# Patient Record
Sex: MALE | Race: WHITE | NOT HISPANIC OR LATINO | Employment: OTHER | URBAN - METROPOLITAN AREA
[De-identification: names, ages, dates, MRNs, and addresses within clinical notes are randomized per-mention and may not be internally consistent; named-entity substitution may affect disease eponyms.]

---

## 2024-01-22 ENCOUNTER — OFFICE VISIT (OUTPATIENT)
Dept: WOUND CARE | Facility: HOSPITAL | Age: 74
End: 2024-01-22
Payer: COMMERCIAL

## 2024-01-22 VITALS
HEART RATE: 89 BPM | TEMPERATURE: 98.2 F | SYSTOLIC BLOOD PRESSURE: 153 MMHG | BODY MASS INDEX: 35.55 KG/M2 | HEIGHT: 69 IN | RESPIRATION RATE: 18 BRPM | DIASTOLIC BLOOD PRESSURE: 93 MMHG | WEIGHT: 240 LBS

## 2024-01-22 DIAGNOSIS — I50.32 CHRONIC HEART FAILURE WITH PRESERVED EJECTION FRACTION (HCC): ICD-10-CM

## 2024-01-22 DIAGNOSIS — R60.9 LIPEDEMA: ICD-10-CM

## 2024-01-22 DIAGNOSIS — L97.912 NON-PRESSURE ULCER OF LOWER EXTREMITY WITH FAT LAYER EXPOSED, RIGHT (HCC): Primary | ICD-10-CM

## 2024-01-22 DIAGNOSIS — I87.312 CHRONIC VENOUS HYPERTENSION (IDIOPATHIC) WITH ULCER OF LEFT LOWER EXTREMITY (CODE) (HCC): ICD-10-CM

## 2024-01-22 DIAGNOSIS — I87.311 CHRONIC VENOUS HYPERTENSION (IDIOPATHIC) WITH ULCER OF RIGHT LOWER EXTREMITY (CODE) (HCC): ICD-10-CM

## 2024-01-22 DIAGNOSIS — L97.922 NON-PRESSURE ULCER OF LOWER EXTREMITY WITH FAT LAYER EXPOSED, LEFT (HCC): ICD-10-CM

## 2024-01-22 DIAGNOSIS — I89.0 LYMPHEDEMA OF BOTH LOWER EXTREMITIES: ICD-10-CM

## 2024-01-22 PROCEDURE — 97598 DBRDMT OPN WND ADDL 20CM/<: CPT | Performed by: STUDENT IN AN ORGANIZED HEALTH CARE EDUCATION/TRAINING PROGRAM

## 2024-01-22 PROCEDURE — 97597 DBRDMT OPN WND 1ST 20 CM/<: CPT | Performed by: STUDENT IN AN ORGANIZED HEALTH CARE EDUCATION/TRAINING PROGRAM

## 2024-01-22 PROCEDURE — 99204 OFFICE O/P NEW MOD 45 MIN: CPT | Performed by: STUDENT IN AN ORGANIZED HEALTH CARE EDUCATION/TRAINING PROGRAM

## 2024-01-22 PROCEDURE — 99213 OFFICE O/P EST LOW 20 MIN: CPT | Performed by: STUDENT IN AN ORGANIZED HEALTH CARE EDUCATION/TRAINING PROGRAM

## 2024-01-22 RX ORDER — CARVEDILOL 12.5 MG/1
12.5 TABLET ORAL 2 TIMES DAILY WITH MEALS
COMMUNITY

## 2024-01-22 RX ORDER — AMLODIPINE BESYLATE 5 MG/1
5 TABLET ORAL DAILY
COMMUNITY

## 2024-01-22 RX ORDER — DIAZEPAM 10 MG/1
10 TABLET ORAL EVERY 6 HOURS PRN
COMMUNITY

## 2024-01-22 RX ORDER — LIDOCAINE HYDROCHLORIDE 40 MG/ML
5 SOLUTION TOPICAL ONCE
Status: COMPLETED | OUTPATIENT
Start: 2024-01-22 | End: 2024-01-22

## 2024-01-22 RX ORDER — FUROSEMIDE 40 MG/1
40 TABLET ORAL DAILY
Qty: 3 TABLET | Refills: 0 | Status: SHIPPED | OUTPATIENT
Start: 2024-01-22 | End: 2024-01-23

## 2024-01-22 RX ORDER — PRIMIDONE 50 MG/1
50 TABLET ORAL 2 TIMES DAILY
COMMUNITY

## 2024-01-22 RX ADMIN — LIDOCAINE HYDROCHLORIDE 5 ML: 40 SOLUTION TOPICAL at 15:01

## 2024-01-22 NOTE — PROGRESS NOTES
Patient ID: Mejia Baez is a 73 y.o. male Date of Birth 1950     Chief Complaint  Chief Complaint   Patient presents with    New Patient Visit     Bilat ankle wounds       Allergies  Amoxicillin    Assessment:     Diagnoses and all orders for this visit:    Non-pressure ulcer of lower extremity with fat layer exposed, right (HCC)  -     lidocaine (XYLOCAINE) 4 % topical solution 5 mL  -     Wound miscellaneous orders; Future  -     Wound miscellaneous orders; Future  -     Wound compression and edema control; Future  -     Wound miscellaneous orders; Future  -     Wound cleansing and dressings; Future  -     Debridement  -     Debridement    Non-pressure ulcer of lower extremity with fat layer exposed, left (HCC)  -     lidocaine (XYLOCAINE) 4 % topical solution 5 mL  -     Wound miscellaneous orders; Future  -     Wound miscellaneous orders; Future  -     Wound compression and edema control; Future  -     Wound miscellaneous orders; Future  -     Wound cleansing and dressings; Future  -     Debridement    Chronic venous hypertension (idiopathic) with ulcer of left lower extremity (CODE) ([facility])  -     furosemide (LASIX) 40 mg tablet; Take 1 tablet (40 mg total) by mouth daily for 3 days    Chronic venous hypertension (idiopathic) with ulcer of right lower extremity (CODE) ([facility])  -     furosemide (LASIX) 40 mg tablet; Take 1 tablet (40 mg total) by mouth daily for 3 days    Lipedema    Lymphedema of both lower extremities  -     furosemide (LASIX) 40 mg tablet; Take 1 tablet (40 mg total) by mouth daily for 3 days    Chronic heart failure with preserved ejection fraction ([facility])  -     furosemide (LASIX) 40 mg tablet; Take 1 tablet (40 mg total) by mouth daily for 3 days    Other orders  -     amLODIPine (NORVASC) 5 mg tablet; Take 5 mg by mouth daily  -     carvedilol (COREG) 12.5 mg tablet; Take 12.5 mg by mouth 2 (two) times a day with meals  -     primidone (MYSOLINE) 50 mg tablet; Take 50 mg by mouth 2  "(two) times a day  -     diazepam (VALIUM) 10 mg tablet; Take 10 mg by mouth every 6 (six) hours as needed for anxiety              Debridement   Wound 01/22/24 Venous Ulcer Leg Distal;Posterior;Right    Universal Protocol:  Consent: Verbal consent obtained.  Risks and benefits: risks, benefits and alternatives were discussed  Consent given by: patient  Time out: Immediately prior to procedure a \"time out\" was called to verify the correct patient, procedure, equipment, support staff and site/side marked as required.  Patient identity confirmed: verbally with patient    Debridement Details  Performed by: physician  Debridement type: selective  Pain control: lidocaine 4%      Post-debridement measurements  Length (cm): 13  Width (cm): 8  Depth (cm): 0.1  Percent debrided: 30%  Surface Area (cm^2): 104  Area Debrided (cm^2): 31.2  Volume (cm^3): 10.4    Devitalized tissue debrided: biofilm and exudate  Instrument(s) utilized: curette  Bleeding: small  Hemostasis obtained with: pressure  Procedural pain (0-10): 1  Post-procedural pain: 0   Response to treatment: procedure was tolerated well    Debridement   Wound 01/22/24 Venous Ulcer Leg Right;Distal    Universal Protocol:  Consent: Verbal consent obtained.  Risks and benefits: risks, benefits and alternatives were discussed  Consent given by: patient  Time out: Immediately prior to procedure a \"time out\" was called to verify the correct patient, procedure, equipment, support staff and site/side marked as required.  Patient identity confirmed: verbally with patient    Debridement Details  Performed by: physician  Debridement type: selective  Pain control: lidocaine 4%      Post-debridement measurements  Length (cm): 6  Width (cm): 11  Depth (cm): 0.1  Percent debrided: 60%  Surface Area (cm^2): 66  Area Debrided (cm^2): 39.6  Volume (cm^3): 6.6    Devitalized tissue debrided: biofilm and exudate  Instrument(s) utilized: curette  Bleeding: small  Hemostasis obtained " "with: pressure  Procedural pain (0-10): 1  Post-procedural pain: 0   Response to treatment: procedure was tolerated well    Debridement   Wound 01/22/24 Venous Ulcer Leg Distal;Left;Posterior    Universal Protocol:  Consent: Verbal consent obtained.  Risks and benefits: risks, benefits and alternatives were discussed  Consent given by: patient  Time out: Immediately prior to procedure a \"time out\" was called to verify the correct patient, procedure, equipment, support staff and site/side marked as required.  Patient identity confirmed: verbally with patient    Debridement Details  Performed by: physician  Debridement type: selective  Pain control: lidocaine 4%      Post-debridement measurements  Length (cm): 0.7  Width (cm): 0.3  Depth (cm): 0.1  Percent debrided: 90%  Surface Area (cm^2): 0.21  Area Debrided (cm^2): 0.19  Volume (cm^3): 0.02    Devitalized tissue debrided: biofilm and exudate  Instrument(s) utilized: curette  Bleeding: small  Hemostasis obtained with: pressure  Procedural pain (0-10): 1  Post-procedural pain: 0   Response to treatment: procedure was tolerated well        Plan:   It was a pleasure to see Mejia Baez for wound care consult today  Selective debridement performed today as above  Start plan of care as noted below with over alginate to bilateral lower extremities with Coflex lite.  Patient advised to remove these if they become wet, have increased edema which causes him to come painful, or if he begins to get short of breath  Most recent BMP from 2 weeks ago reviewed.  Patient does appear to be volume overloaded and states that he was advised to use furosemide in the past.  Patient declined due to the frequent urination he experienced from this.  I advised patient that he is overloaded with bilateral lower extremity edema and get edema.  He is agreeable to trying Lasix x 3 days to see if this helps.  Lymphedema pumps ready ordered by PCP.  Patient already had fitting for these.  " Expect these to arrive within the next few days.  I advised him that he can use them in conjunction with Coflex wraps.  Patient notes that he sits on the sofa majority of the day with his legs in dependent position.  I advised him that while he was sitting he should keep legs at least heart height.  No signs or symptoms of infection today. Patient understands that if any signs of infection start (such as increased redness, drainage, pain, fever, chills, diaphoresis), they should call our office or proceed to the ER or Urgent Care.  Patient should continue a high protein diet to facilitate wound healing  Patient is advised to not submerge wound or leave wound open to air.  Follow up in 1 weeks  Given the multi-factorial nature of wound care, additional time was taken to review patient's treatment plan with other specialties and most recent pertinent lab work and imaging.   All plans of care discussed with patient at bedside who verbalized understanding with treatment plan.    Wound 01/22/24 Venous Ulcer Leg Distal;Posterior;Right (Active)   Wound Image Images linked 01/22/24 1455   Wound Description White;Pink;Epithelialization;Eschar;Beefy red 01/22/24 1455   Jessica-wound Assessment Dry;Scaly 01/22/24 1455   Wound Length (cm) 13 cm 01/22/24 1455   Wound Width (cm) 8 cm 01/22/24 1455   Wound Depth (cm) 0.1 cm 01/22/24 1455   Wound Surface Area (cm^2) 104 cm^2 01/22/24 1455   Wound Volume (cm^3) 10.4 cm^3 01/22/24 1455   Calculated Wound Volume (cm^3) 10.4 cm^3 01/22/24 1455   Drainage Amount Small 01/22/24 1455   Drainage Description Serous;Serosanguineous 01/22/24 1455   Non-staged Wound Description Full thickness 01/22/24 1455   Dressing Status Other (Comment) (no dreswsing on arrival) 01/22/24 1455       Wound 01/22/24 Venous Ulcer Leg Right;Distal (Active)   Wound Image Images linked 01/22/24 1453   Wound Description Eschar;Brown 01/22/24 1452   Jessica-wound Assessment Hyperpigmented;Edema;Dry;Scaly 01/22/24 1452    Wound Length (cm) 6 cm 01/22/24 1452   Wound Width (cm) 11 cm 01/22/24 1452   Wound Depth (cm) 0 cm 01/22/24 1452   Wound Surface Area (cm^2) 66 cm^2 01/22/24 1452   Wound Volume (cm^3) 0 cm^3 01/22/24 1452   Calculated Wound Volume (cm^3) 0 cm^3 01/22/24 1452   Drainage Amount Scant 01/22/24 1452   Drainage Description Serous 01/22/24 1452   Non-staged Wound Description Partial thickness 01/22/24 1452   Dressing Status Other (Comment) (no dressing on arrival) 01/22/24 1452       Wound 01/22/24 Venous Ulcer Leg Distal;Left;Posterior (Active)   Wound Image Images linked 01/22/24 1519   Wound Description Beefy red;Pink;Eschar 01/22/24 1519   Jessica-wound Assessment Dry;Scaly 01/22/24 1519   Wound Length (cm) 0.7 cm 01/22/24 1519   Wound Width (cm) 0.3 cm 01/22/24 1519   Wound Depth (cm) 0.1 cm 01/22/24 1519   Wound Surface Area (cm^2) 0.21 cm^2 01/22/24 1519   Wound Volume (cm^3) 0.021 cm^3 01/22/24 1519   Calculated Wound Volume (cm^3) 0.02 cm^3 01/22/24 1519   Drainage Amount None 01/22/24 1519   Non-staged Wound Description Full thickness 01/22/24 1519   Dressing Status Other (Comment) (no dressing on arrival) 01/22/24 1519       Wound 01/22/24 Venous Ulcer Leg Distal;Posterior;Right (Active)   Date First Assessed/Time First Assessed: 01/22/24 1450   Primary Wound Type: Venous Ulcer  Location: Leg  Wound Location Orientation: Distal;Posterior;Right       Wound 01/22/24 Venous Ulcer Leg Right;Distal (Active)   Date First Assessed/Time First Assessed: 01/22/24 1451   Primary Wound Type: Venous Ulcer  Location: Leg  Wound Location Orientation: Right;Distal       Wound 01/22/24 Venous Ulcer Leg Distal;Left;Posterior (Active)   Date First Assessed/Time First Assessed: 01/22/24 1517   Primary Wound Type: Venous Ulcer  Location: Leg  Wound Location Orientation: Distal;Left;Posterior       Subjective:      .    1/22/24: Carito Sahni Mejia is a pleasant 73-year-old male with a past medical history of obesity, HFpEF G1DD,  htn, ckd, chronic venous stasis disease here today for initial wound care consult.  Patient was seen in ER on 1/5/24 at [facility] recently for lower extremity edema and presumed cellulitis and was advised to go to see wound care.  Also prescribed 10 days Keflex at that time which he completed.  Unfortunately the wound care office that he lives close to do not take his insurance so he is here today at our office.  States that he has been leaving wounds open to air and they have persisted and gotten worse over the past month.  Denies any symptoms of infection including fever chills diaphoresis.  His lower extremity edema has been a problem and his PCP recently ordered him lymphedema pumps.  He also follows for cardiology and states that they have been having difficulty controlling his blood pressures.  Notes that legs have been getting larger and he has been gaining weight.  Is already following with cardiology.        The following portions of the patient's history were reviewed and updated as appropriate: allergies, current medications, past family history, past medical history, past social history, past surgical history, and problem list.    Review of Systems   Constitutional:  Negative for chills, diaphoresis and fever.   Respiratory:  Negative for cough, shortness of breath and wheezing.    Skin:  Positive for wound.   All other systems reviewed and are negative.        Objective:       Wound 01/22/24 Venous Ulcer Leg Distal;Posterior;Right (Active)   Wound Image Images linked 01/22/24 1455   Wound Description White;Pink;Epithelialization;Eschar;Beefy red 01/22/24 1455   Jessica-wound Assessment Dry;Scaly 01/22/24 1455   Wound Length (cm) 13 cm 01/22/24 1455   Wound Width (cm) 8 cm 01/22/24 1455   Wound Depth (cm) 0.1 cm 01/22/24 1455   Wound Surface Area (cm^2) 104 cm^2 01/22/24 1455   Wound Volume (cm^3) 10.4 cm^3 01/22/24 1455   Calculated Wound Volume (cm^3) 10.4 cm^3 01/22/24 1455   Drainage Amount Small  "01/22/24 1455   Drainage Description Serous;Serosanguineous 01/22/24 1455   Non-staged Wound Description Full thickness 01/22/24 1455   Dressing Status Other (Comment) (no dreswsing on arrival) 01/22/24 1455       Wound 01/22/24 Venous Ulcer Leg Right;Distal (Active)   Wound Image Images linked 01/22/24 1453   Wound Description Eschar;Brown 01/22/24 1452   Jessica-wound Assessment Hyperpigmented;Edema;Dry;Scaly 01/22/24 1452   Wound Length (cm) 6 cm 01/22/24 1452   Wound Width (cm) 11 cm 01/22/24 1452   Wound Depth (cm) 0 cm 01/22/24 1452   Wound Surface Area (cm^2) 66 cm^2 01/22/24 1452   Wound Volume (cm^3) 0 cm^3 01/22/24 1452   Calculated Wound Volume (cm^3) 0 cm^3 01/22/24 1452   Drainage Amount Scant 01/22/24 1452   Drainage Description Serous 01/22/24 1452   Non-staged Wound Description Partial thickness 01/22/24 1452   Dressing Status Other (Comment) (no dressing on arrival) 01/22/24 1452       Wound 01/22/24 Venous Ulcer Leg Distal;Left;Posterior (Active)   Wound Image Images linked 01/22/24 1519   Wound Description Beefy red;Pink;Eschar 01/22/24 1519   Jessica-wound Assessment Dry;Scaly 01/22/24 1519   Wound Length (cm) 0.7 cm 01/22/24 1519   Wound Width (cm) 0.3 cm 01/22/24 1519   Wound Depth (cm) 0.1 cm 01/22/24 1519   Wound Surface Area (cm^2) 0.21 cm^2 01/22/24 1519   Wound Volume (cm^3) 0.021 cm^3 01/22/24 1519   Calculated Wound Volume (cm^3) 0.02 cm^3 01/22/24 1519   Drainage Amount None 01/22/24 1519   Non-staged Wound Description Full thickness 01/22/24 1519   Dressing Status Other (Comment) (no dressing on arrival) 01/22/24 1519       /93   Pulse 89   Temp 98.2 °F (36.8 °C)   Resp 18   Ht 5' 9\" (1.753 m)   Wt 109 kg (240 lb)   BMI 35.44 kg/m²     Physical Exam  Vitals reviewed.   Constitutional:       Appearance: Normal appearance.   HENT:      Head: Normocephalic and atraumatic.   Eyes:      Extraocular Movements: Extraocular movements intact.   Pulmonary:      Effort: Pulmonary effort is " normal.   Musculoskeletal:      Cervical back: Neck supple.      Right lower leg: Edema (+2) present.      Left lower leg: Edema (+2) present.   Skin:     Comments: Hyperpigmentation of bilateral lower extremities consistent with chronic venous stasis disease.    Several discrete wound openings that had dried exudate over bilateral lower extremities.  Dried exudate was debrided to reveal healthy wound bed tissue without any signs of infection.   Neurological:      Mental Status: He is alert.   Psychiatric:         Mood and Affect: Mood normal.                         Wound Instructions:  Orders Placed This Encounter   Procedures    Wound miscellaneous orders     Protein: Eat protein with each meal to promote healing.  Examples of protein are fish, meat, chicken, nuts, peanut butter, eggs, lentils, edamame or a protein shake.     Standing Status:   Future     Standing Expiration Date:   1/22/2025    Wound miscellaneous orders     Wound infection:  If you have signs of infection please call the wound center.  If the wound center is closed- please go to the Emergency department.  Some signs of infection:  fever, chills, increased redness, red streaks, increase in pain, increased drainage.  Drainage with an odor, Change in drainage color: white/milky/green/tan/yellow,  an increase in swelling, chest pain and/or shortness of breath.     Standing Status:   Future     Standing Expiration Date:   1/22/2025    Wound compression and edema control     Unna Boot/ Multi-layer compression wrap Instructions: COFLEX LITE    Keep compression wrap/wraps clean and dry. If wraps are too tight and you experience numbness/tingling, call the wound center. If after hours, remove wraps or proceed to nearest E.R. and call wound center in AM.    Wrap will be changed 1 x weekly    Avoid prolonged standing in one place.    Elevate leg(s) above the level of the heart when sitting or as much as possible.     Standing Status:   Future     Standing  Expiration Date:   1/22/2025    Wound miscellaneous orders     A water  pill is being ordered for you for 3 days- please take them as ordered  and until complete     Standing Status:   Future     Standing Expiration Date:   1/22/2025    Wound cleansing and dressings     Wash your hands with soap and water.  Remove old dressing, discard into plastic bag and place in trash.  Cleanse the wound with soap and water prior to applying a clean dressing. Do not use tissue or cotton balls. Do not scrub the wound. Pat dry using gauze.  Shower no - do not get the dressings wet  Apply mild moisturizer to skin surrounding wound  Apply silver alginate to the leg wounds.  Cover with gauze  Secure with coflex lite  Change dressing weekly at wound care center     Standing Status:   Future     Standing Expiration Date:   1/22/2025    Debridement     This order was created via procedure documentation    Debridement     This order was created via procedure documentation    Debridement     This order was created via procedure documentation        Diagnosis ICD-10-CM Associated Orders   1. Non-pressure ulcer of lower extremity with fat layer exposed, right ([facility])  L97.912 lidocaine (XYLOCAINE) 4 % topical solution 5 mL     Wound miscellaneous orders     Wound miscellaneous orders     Wound compression and edema control     Wound miscellaneous orders     Wound cleansing and dressings     Debridement     Debridement      2. Non-pressure ulcer of lower extremity with fat layer exposed, left ([facility])  L97.922 lidocaine (XYLOCAINE) 4 % topical solution 5 mL     Wound miscellaneous orders     Wound miscellaneous orders     Wound compression and edema control     Wound miscellaneous orders     Wound cleansing and dressings     Debridement      3. Chronic venous hypertension (idiopathic) with ulcer of left lower extremity (CODE) ([facility])  I87.312 furosemide (LASIX) 40 mg tablet      4. Chronic venous hypertension (idiopathic) with ulcer of right lower  "extremity (CODE) ([facility])  I87.311 furosemide (LASIX) 40 mg tablet      5. Lipedema  R60.9       6. Lymphedema of both lower extremities  I89.0 furosemide (LASIX) 40 mg tablet      7. Chronic heart failure with preserved ejection fraction ([facility])  I50.32 furosemide (LASIX) 40 mg tablet          --  Katlyn Rossi MD    \"This note has been constructed using a voice recognition system. Therefore there may be syntax, spelling, and/or grammatical errors. Occasional wrong word or \"sound alike\" substitutions may have occurred due to the inherent limitations of voice recognition software. Read the chart carefully and recognize, using context, where substitutions have occurred. Please call if you have any questions.\"     "

## 2024-01-22 NOTE — PATIENT INSTRUCTIONS
Orders Placed This Encounter   Procedures    Wound miscellaneous orders     Protein: Eat protein with each meal to promote healing.  Examples of protein are fish, meat, chicken, nuts, peanut butter, eggs, lentils, edamame or a protein shake.     Standing Status:   Future     Standing Expiration Date:   1/22/2025    Wound miscellaneous orders     Wound infection:  If you have signs of infection please call the wound center.  If the wound center is closed- please go to the Emergency department.  Some signs of infection:  fever, chills, increased redness, red streaks, increase in pain, increased drainage.  Drainage with an odor, Change in drainage color: white/milky/green/tan/yellow,  an increase in swelling, chest pain and/or shortness of breath.     Standing Status:   Future     Standing Expiration Date:   1/22/2025    Wound compression and edema control     Unna Boot/ Multi-layer compression wrap Instructions: COFLEX LITE    Keep compression wrap/wraps clean and dry. If wraps are too tight and you experience numbness/tingling, call the wound center. If after hours, remove wraps or proceed to nearest E.R. and call wound center in AM.    Wrap will be changed 1 x weekly    Avoid prolonged standing in one place.    Elevate leg(s) above the level of the heart when sitting or as much as possible.     Standing Status:   Future     Standing Expiration Date:   1/22/2025    Wound miscellaneous orders     A water  pill is being ordered for you for 3 days- please take them as ordered  and until complete     Standing Status:   Future     Standing Expiration Date:   1/22/2025    Wound cleansing and dressings     Wash your hands with soap and water.  Remove old dressing, discard into plastic bag and place in trash.  Cleanse the wound with soap and water prior to applying a clean dressing. Do not use tissue or cotton balls. Do not scrub the wound. Pat dry using gauze.  Shower no - do not get the dressings wet  Apply mild  moisturizer to skin surrounding wound  Apply silver alginate to the leg wounds.  Cover with gauze  Secure with coflex lite  Change dressing weekly at wound care center     Standing Status:   Future     Standing Expiration Date:   1/22/2025

## 2024-01-29 ENCOUNTER — OFFICE VISIT (OUTPATIENT)
Dept: WOUND CARE | Facility: HOSPITAL | Age: 74
End: 2024-01-29
Payer: COMMERCIAL

## 2024-01-29 VITALS
HEART RATE: 92 BPM | RESPIRATION RATE: 17 BRPM | DIASTOLIC BLOOD PRESSURE: 97 MMHG | SYSTOLIC BLOOD PRESSURE: 165 MMHG | TEMPERATURE: 98.3 F

## 2024-01-29 DIAGNOSIS — I87.311 CHRONIC VENOUS HYPERTENSION (IDIOPATHIC) WITH ULCER OF RIGHT LOWER EXTREMITY (CODE) (HCC): ICD-10-CM

## 2024-01-29 DIAGNOSIS — I87.312 CHRONIC VENOUS HYPERTENSION (IDIOPATHIC) WITH ULCER OF LEFT LOWER EXTREMITY (CODE) (HCC): ICD-10-CM

## 2024-01-29 DIAGNOSIS — L97.912 NON-PRESSURE ULCER OF LOWER EXTREMITY WITH FAT LAYER EXPOSED, RIGHT (HCC): Primary | ICD-10-CM

## 2024-01-29 DIAGNOSIS — L97.922 NON-PRESSURE ULCER OF LOWER EXTREMITY WITH FAT LAYER EXPOSED, LEFT (HCC): ICD-10-CM

## 2024-01-29 DIAGNOSIS — R60.9 LIPEDEMA: ICD-10-CM

## 2024-01-29 DIAGNOSIS — I89.0 LYMPHEDEMA OF BOTH LOWER EXTREMITIES: ICD-10-CM

## 2024-01-29 PROCEDURE — 97598 DBRDMT OPN WND ADDL 20CM/<: CPT | Performed by: STUDENT IN AN ORGANIZED HEALTH CARE EDUCATION/TRAINING PROGRAM

## 2024-01-29 PROCEDURE — 97597 DBRDMT OPN WND 1ST 20 CM/<: CPT | Performed by: STUDENT IN AN ORGANIZED HEALTH CARE EDUCATION/TRAINING PROGRAM

## 2024-01-29 NOTE — PROGRESS NOTES
Patient ID: Mejia Baez is a 73 y.o. male Date of Birth 1950     Chief Complaint  Chief Complaint   Patient presents with    Follow Up Wound Care Visit     RLE and LLE       Allergies  Amoxicillin    Assessment:     Diagnoses and all orders for this visit:    Non-pressure ulcer of lower extremity with fat layer exposed, right (HCC)  -     Wound miscellaneous orders; Future  -     Wound compression and edema control; Future  -     Wound cleansing and dressings Venous Ulcer Right;Distal Leg; Future  -     Wound cleansing and dressings; Future  -     Debridement  -     Debridement    Non-pressure ulcer of lower extremity with fat layer exposed, left (HCC)  -     Wound miscellaneous orders; Future  -     Wound compression and edema control; Future  -     Wound cleansing and dressings Venous Ulcer Right;Distal Leg; Future  -     Wound cleansing and dressings; Future  -     Debridement    Chronic venous hypertension (idiopathic) with ulcer of left lower extremity (CODE) ([facility])  -     Wound miscellaneous orders; Future  -     Wound compression and edema control; Future  -     Wound cleansing and dressings Venous Ulcer Right;Distal Leg; Future  -     Wound cleansing and dressings; Future    Chronic venous hypertension (idiopathic) with ulcer of right lower extremity (CODE) ([facility])  -     Wound miscellaneous orders; Future  -     Wound compression and edema control; Future  -     Wound cleansing and dressings Venous Ulcer Right;Distal Leg; Future  -     Wound cleansing and dressings; Future    Lymphedema of both lower extremities  -     Wound miscellaneous orders; Future  -     Wound compression and edema control; Future  -     Wound cleansing and dressings Venous Ulcer Right;Distal Leg; Future  -     Wound cleansing and dressings; Future    Lipedema  -     Wound miscellaneous orders; Future  -     Wound compression and edema control; Future  -     Wound cleansing and dressings Venous Ulcer Right;Distal Leg; Future  -  "    Wound cleansing and dressings; Future              Debridement   Wound 01/22/24 Venous Ulcer Leg Distal;Posterior;Right    Universal Protocol:  Consent: Verbal consent obtained.  Risks and benefits: risks, benefits and alternatives were discussed  Consent given by: patient  Time out: Immediately prior to procedure a \"time out\" was called to verify the correct patient, procedure, equipment, support staff and site/side marked as required.  Patient identity confirmed: verbally with patient    Debridement Details  Performed by: physician  Debridement type: selective  Pain control: lidocaine 4%      Post-debridement measurements  Length (cm): 7  Width (cm): 7.6  Depth (cm): 0.1  Percent debrided: 90%  Surface Area (cm^2): 53.2  Area Debrided (cm^2): 47.88  Volume (cm^3): 5.32    Devitalized tissue debrided: biofilm and exudate  Instrument(s) utilized: curette  Bleeding: small  Hemostasis obtained with: pressure  Procedural pain (0-10): 1  Post-procedural pain: 0   Response to treatment: procedure was tolerated well    Debridement   Wound 01/22/24 Venous Ulcer Leg Right;Distal    Universal Protocol:  Consent: Verbal consent obtained.  Risks and benefits: risks, benefits and alternatives were discussed  Consent given by: patient  Time out: Immediately prior to procedure a \"time out\" was called to verify the correct patient, procedure, equipment, support staff and site/side marked as required.  Patient identity confirmed: verbally with patient    Debridement Details  Performed by: physician  Debridement type: selective  Pain control: lidocaine 4%      Post-debridement measurements  Length (cm): 10.5  Width (cm): 4.7  Depth (cm): 0.1  Percent debrided: 90%  Surface Area (cm^2): 49.35  Area Debrided (cm^2): 44.42  Volume (cm^3): 4.94    Devitalized tissue debrided: biofilm and exudate  Instrument(s) utilized: curette  Bleeding: small  Hemostasis obtained with: pressure  Procedural pain (0-10): 1  Post-procedural pain: 0 " "  Response to treatment: procedure was tolerated well    Debridement   Wound 01/22/24 Venous Ulcer Leg Distal;Left;Posterior    Universal Protocol:  Consent: Verbal consent obtained.  Risks and benefits: risks, benefits and alternatives were discussed  Consent given by: patient  Time out: Immediately prior to procedure a \"time out\" was called to verify the correct patient, procedure, equipment, support staff and site/side marked as required.  Patient identity confirmed: verbally with patient    Debridement Details  Performed by: physician  Debridement type: selective  Pain control: lidocaine 4%      Post-debridement measurements  Length (cm): 1  Width (cm): 2.2  Depth (cm): 0.1  Percent debrided: 90%  Surface Area (cm^2): 2.2  Area Debrided (cm^2): 1.98  Volume (cm^3): 0.22    Devitalized tissue debrided: biofilm and exudate  Instrument(s) utilized: curette  Bleeding: small  Hemostasis obtained with: pressure  Procedural pain (0-10): 1  Post-procedural pain: 0   Response to treatment: procedure was tolerated well        Plan:   It was a pleasure to see Mejia Baez for wound care follow up today  Selective debridement performed today as above  Wound is improving   Continue plan of care as noted below with xeroform, coflex lite  No signs or symptoms of infection today. Patient understands that if any signs of infection start (such as increased redness, drainage, pain, fever, chills, diaphoresis), they should call our office or proceed to the ER or Urgent Care.  Patient should continue a high protein diet to facilitate wound healing  Patient is advised to not submerge wound or leave wound open to air.  Follow up in 1 weeks  Given the multi-factorial nature of wound care, additional time was taken to review patient's treatment plan with other specialties and most recent pertinent lab work and imaging.   All plans of care discussed with patient at bedside who verbalized understanding with treatment plan.'  Wound " 01/22/24 Venous Ulcer Leg Distal;Posterior;Right (Active)   Wound Image Images linked (Simultaneous filing. User may not have seen previous data.) 01/29/24 1500   Wound Description Brown;Other (Comment);Epithelialization (scab) 01/29/24 1500   Jessica-wound Assessment Dry;Scaly;Erythema 01/29/24 1500   Wound Length (cm) 7 cm 01/29/24 1500   Wound Width (cm) 7.6 cm 01/29/24 1500   Wound Depth (cm) 0 cm 01/29/24 1500   Wound Surface Area (cm^2) 53.2 cm^2 01/29/24 1500   Wound Volume (cm^3) 0 cm^3 01/29/24 1500   Calculated Wound Volume (cm^3) 0 cm^3 01/29/24 1500   Change in Wound Size % 100 01/29/24 1500   Drainage Amount None 01/29/24 1500   Non-staged Wound Description Full thickness 01/29/24 1500   Dressing Status Intact (upon arrival) 01/29/24 1500       Wound 01/22/24 Venous Ulcer Leg Right;Distal (Active)   Wound Image Images linked (Simultaneous filing. User may not have seen previous data.) 01/29/24 1459   Wound Description Brown;Epithelialization;Other (Comment) (scab) 01/29/24 1459   Jessica-wound Assessment Hyperpigmented;Edema;Dry;Scaly;Maceration 01/29/24 1459   Wound Length (cm) 10.5 cm 01/29/24 1459   Wound Width (cm) 4.7 cm 01/29/24 1459   Wound Depth (cm) 0 cm 01/29/24 1459   Wound Surface Area (cm^2) 49.35 cm^2 01/29/24 1459   Wound Volume (cm^3) 0 cm^3 01/29/24 1459   Calculated Wound Volume (cm^3) 0 cm^3 01/29/24 1459   Drainage Amount Moderate 01/29/24 1459   Drainage Description Serous 01/29/24 1459   Non-staged Wound Description Full thickness 01/29/24 1459   Dressing Status Intact (upon arrival) 01/29/24 1459       Wound 01/22/24 Venous Ulcer Leg Distal;Left;Posterior (Active)   Wound Image Images linked (Simultaneous filing. User may not have seen previous data.) 01/29/24 1502   Wound Description Brown;Other (Comment) (scab) 01/29/24 1502   Jessica-wound Assessment Dry;Scaly;Edema 01/29/24 1502   Wound Length (cm) 1 cm 01/29/24 1502   Wound Width (cm) 2.2 cm 01/29/24 1502   Wound Depth (cm) 0 cm  01/29/24 1502   Wound Surface Area (cm^2) 2.2 cm^2 01/29/24 1502   Wound Volume (cm^3) 0 cm^3 01/29/24 1502   Calculated Wound Volume (cm^3) 0 cm^3 01/29/24 1502   Change in Wound Size % 100 01/29/24 1502   Drainage Amount None 01/29/24 1502   Non-staged Wound Description Full thickness 01/29/24 1502   Dressing Status Intact (upon arrival) 01/29/24 1502       Wound 01/22/24 Venous Ulcer Leg Distal;Posterior;Right (Active)   Date First Assessed/Time First Assessed: 01/22/24 1450   Primary Wound Type: Venous Ulcer  Location: Leg  Wound Location Orientation: Distal;Posterior;Right       Wound 01/22/24 Venous Ulcer Leg Right;Distal (Active)   Date First Assessed/Time First Assessed: 01/22/24 1451   Primary Wound Type: Venous Ulcer  Location: Leg  Wound Location Orientation: Right;Distal       Wound 01/22/24 Venous Ulcer Leg Distal;Left;Posterior (Active)   Date First Assessed/Time First Assessed: 01/22/24 1517   Primary Wound Type: Venous Ulcer  Location: Leg  Wound Location Orientation: Distal;Left;Posterior       Subjective:      .    1/29/24: Tolerated coflex wrap well. No symptoms of infeciton     1/22/24: Consult - Mejia is a pleasant 73-year-old male with a past medical history of obesity, HFpEF G1DD, htn, ckd, chronic venous stasis disease here today for initial wound care consult.  Patient was seen in ER on 1/5/24 at [facility] recently for lower extremity edema and presumed cellulitis and was advised to go to see wound care.  Also prescribed 10 days Keflex at that time which he completed.  Unfortunately the wound care office that he lives close to do not take his insurance so he is here today at our office.  States that he has been leaving wounds open to air and they have persisted and gotten worse over the past month.  Denies any symptoms of infection including fever chills diaphoresis.  His lower extremity edema has been a problem and his PCP recently ordered him lymphedema pumps.  He also follows for  cardiology and states that they have been having difficulty controlling his blood pressures.  Notes that legs have been getting larger and he has been gaining weight.  Is already following with cardiology.          The following portions of the patient's history were reviewed and updated as appropriate: allergies, current medications, past family history, past medical history, past social history, past surgical history, and problem list.    Review of Systems   Constitutional:  Negative for chills, diaphoresis and fever.   Skin:  Positive for wound.   All other systems reviewed and are negative.        Objective:       Wound 01/22/24 Venous Ulcer Leg Distal;Posterior;Right (Active)   Wound Image Images linked (Simultaneous filing. User may not have seen previous data.) 01/29/24 1500   Wound Description Brown;Other (Comment);Epithelialization (scab) 01/29/24 1500   Jessica-wound Assessment Dry;Scaly;Erythema 01/29/24 1500   Wound Length (cm) 7 cm 01/29/24 1500   Wound Width (cm) 7.6 cm 01/29/24 1500   Wound Depth (cm) 0 cm 01/29/24 1500   Wound Surface Area (cm^2) 53.2 cm^2 01/29/24 1500   Wound Volume (cm^3) 0 cm^3 01/29/24 1500   Calculated Wound Volume (cm^3) 0 cm^3 01/29/24 1500   Change in Wound Size % 100 01/29/24 1500   Drainage Amount None 01/29/24 1500   Non-staged Wound Description Full thickness 01/29/24 1500   Dressing Status Intact (upon arrival) 01/29/24 1500       Wound 01/22/24 Venous Ulcer Leg Right;Distal (Active)   Wound Image Images linked (Simultaneous filing. User may not have seen previous data.) 01/29/24 1459   Wound Description Brown;Epithelialization;Other (Comment) (scab) 01/29/24 1459   Jessica-wound Assessment Hyperpigmented;Edema;Dry;Scaly;Maceration 01/29/24 1459   Wound Length (cm) 10.5 cm 01/29/24 1459   Wound Width (cm) 4.7 cm 01/29/24 1459   Wound Depth (cm) 0 cm 01/29/24 1459   Wound Surface Area (cm^2) 49.35 cm^2 01/29/24 1459   Wound Volume (cm^3) 0 cm^3 01/29/24 1459   Calculated Wound  Volume (cm^3) 0 cm^3 01/29/24 1459   Drainage Amount Moderate 01/29/24 1459   Drainage Description Serous 01/29/24 1459   Non-staged Wound Description Full thickness 01/29/24 1459   Dressing Status Intact (upon arrival) 01/29/24 1459       Wound 01/22/24 Venous Ulcer Leg Distal;Left;Posterior (Active)   Wound Image Images linked (Simultaneous filing. User may not have seen previous data.) 01/29/24 1502   Wound Description Brown;Other (Comment) (scab) 01/29/24 1502   Jessica-wound Assessment Dry;Scaly;Edema 01/29/24 1502   Wound Length (cm) 1 cm 01/29/24 1502   Wound Width (cm) 2.2 cm 01/29/24 1502   Wound Depth (cm) 0 cm 01/29/24 1502   Wound Surface Area (cm^2) 2.2 cm^2 01/29/24 1502   Wound Volume (cm^3) 0 cm^3 01/29/24 1502   Calculated Wound Volume (cm^3) 0 cm^3 01/29/24 1502   Change in Wound Size % 100 01/29/24 1502   Drainage Amount None 01/29/24 1502   Non-staged Wound Description Full thickness 01/29/24 1502   Dressing Status Intact (upon arrival) 01/29/24 1502       /97   Pulse 92   Temp 98.3 °F (36.8 °C)   Resp 17     Physical Exam  Vitals reviewed.   Constitutional:       Appearance: Normal appearance.   HENT:      Head: Normocephalic and atraumatic.   Eyes:      Extraocular Movements: Extraocular movements intact.   Pulmonary:      Effort: Pulmonary effort is normal.   Musculoskeletal:      Cervical back: Neck supple.   Skin:     Comments: LE wounds smaller than last exam. Serosanguinous exudate w/o signs of infeciton   Neurological:      Mental Status: He is alert.   Psychiatric:         Mood and Affect: Mood normal.                     Wound Instructions:  Orders Placed This Encounter   Procedures    Wound miscellaneous orders     Protein: Eat protein with each meal to promote healing.  Examples of protein are fish, meat, chicken, nuts, peanut butter, eggs, lentils, edamame or a protein shake.            Wound infection:  If you have signs of infection please call the wound center.  If the wound  center is closed- please go to the Emergency department.  Some signs of infection:  fever, chills, increased redness, red streaks, increase in pain, increased drainage.  Drainage with an odor, Change in drainage color: white/milky/green/tan/yellow,  an increase in swelling, chest pain and/or shortness of breath.      ·     Standing Status:   Future     Standing Expiration Date:   1/29/2025    Wound compression and edema control     Unna Boot/ Multi-layer compression wrap Instructions: COFLEX LITE     Keep compression wrap/wraps clean and dry. If wraps are too tight and you experience numbness/tingling, call the wound center. If after hours, remove wraps or proceed to nearest E.R. and call wound center in AM.     Wrap will be changed 1 x weekly     Avoid prolonged standing in one place.     Elevate leg(s) above the level of the heart when sitting or as much as possible.     Standing Status:   Future     Standing Expiration Date:   1/29/2025    Wound cleansing and dressings Venous Ulcer Right;Distal Leg     Right Lower Leg Distal Wound:    Wash your hands with soap and water.  Remove old dressing, discard into plastic bag and place in trash.  Cleanse the wound with soap and water prior to applying a clean dressing. Do not use tissue or cotton balls. Do not scrub the wound. Pat dry using gauze.  Shower no - do not get the dressings wet  Apply mild moisturizer to skin surrounding wound  Apply silver alginate to the leg wounds.  Cover with gauze  Secure with coflex lite  Change dressing weekly at wound care center    This was done today     Standing Status:   Future     Standing Expiration Date:   1/29/2025    Wound cleansing and dressings     RIght and Left Posterior Leg Wounds:    Wash your hands with soap and water.  Remove old dressing, discard into plastic bag and place in trash.  Cleanse the wound with soap and water prior to applying a clean dressing. Do not use tissue or cotton balls. Do not scrub the wound. Pat  dry using gauze.  Shower yes IF you wear a cast cover to protect the wraps; do not get the wraps wet.  Apply moisturizer to skin surrounding wound  Apply Adaptic to the posterior leg wounds.   Cover with gauze.  Secure with Coflex Lite.  Change dressing weekly at Wound Center.    This was done today     Standing Status:   Future     Standing Expiration Date:   1/29/2025    Debridement     This order was created via procedure documentation    Debridement     This order was created via procedure documentation    Debridement     This order was created via procedure documentation        Diagnosis ICD-10-CM Associated Orders   1. Non-pressure ulcer of lower extremity with fat layer exposed, right ([facility])  L97.912 Wound miscellaneous orders     Wound compression and edema control     Wound cleansing and dressings Venous Ulcer Right;Distal Leg     Wound cleansing and dressings     Debridement     Debridement      2. Non-pressure ulcer of lower extremity with fat layer exposed, left ([facility])  L97.922 Wound miscellaneous orders     Wound compression and edema control     Wound cleansing and dressings Venous Ulcer Right;Distal Leg     Wound cleansing and dressings     Debridement      3. Chronic venous hypertension (idiopathic) with ulcer of left lower extremity (CODE) ([facility])  I87.312 Wound miscellaneous orders     Wound compression and edema control     Wound cleansing and dressings Venous Ulcer Right;Distal Leg     Wound cleansing and dressings      4. Chronic venous hypertension (idiopathic) with ulcer of right lower extremity (CODE) ([facility])  I87.311 Wound miscellaneous orders     Wound compression and edema control     Wound cleansing and dressings Venous Ulcer Right;Distal Leg     Wound cleansing and dressings      5. Lymphedema of both lower extremities  I89.0 Wound miscellaneous orders     Wound compression and edema control     Wound cleansing and dressings Venous Ulcer Right;Distal Leg     Wound cleansing and dressings     "  6. Lipedema  R60.9 Wound miscellaneous orders     Wound compression and edema control     Wound cleansing and dressings Venous Ulcer Right;Distal Leg     Wound cleansing and dressings          --  Katlyn Rossi MD    \"This note has been constructed using a voice recognition system. Therefore there may be syntax, spelling, and/or grammatical errors. Occasional wrong word or \"sound alike\" substitutions may have occurred due to the inherent limitations of voice recognition software. Read the chart carefully and recognize, using context, where substitutions have occurred. Please call if you have any questions.\"     "

## 2024-01-29 NOTE — PROGRESS NOTES
Patient ID: Mejia Baez is a 73 y.o. male Date of Birth 1950     Chief Complaint  Chief Complaint   Patient presents with    Follow Up Wound Care Visit     RLE and LLE       Allergies  Amoxicillin    Assessment:    No problem-specific Assessment & Plan notes found for this encounter.       {Assess/PlanSmartLinks:36556}          Procedures    Plan:     Wound 01/22/24 Venous Ulcer Leg Distal;Posterior;Right (Active)   Wound Image Images linked (Simultaneous filing. User may not have seen previous data.) 01/29/24 1500   Wound Description Brown;Other (Comment);Epithelialization (scab) 01/29/24 1500   Jessica-wound Assessment Dry;Scaly;Erythema 01/29/24 1500   Wound Length (cm) 7 cm 01/29/24 1500   Wound Width (cm) 7.6 cm 01/29/24 1500   Wound Depth (cm) 0 cm 01/29/24 1500   Wound Surface Area (cm^2) 53.2 cm^2 01/29/24 1500   Wound Volume (cm^3) 0 cm^3 01/29/24 1500   Calculated Wound Volume (cm^3) 0 cm^3 01/29/24 1500   Change in Wound Size % 100 01/29/24 1500   Drainage Amount None 01/29/24 1500   Non-staged Wound Description Full thickness 01/29/24 1500   Dressing Status Intact (upon arrival) 01/29/24 1500       Wound 01/22/24 Venous Ulcer Leg Right;Distal (Active)   Wound Image Images linked (Simultaneous filing. User may not have seen previous data.) 01/29/24 1459   Wound Description Brown;Epithelialization;Other (Comment) (scab) 01/29/24 1459   Jessica-wound Assessment Hyperpigmented;Edema;Dry;Scaly 01/29/24 1459   Wound Length (cm) 10.5 cm 01/29/24 1459   Wound Width (cm) 4.7 cm 01/29/24 1459   Wound Depth (cm) 0 cm 01/29/24 1459   Wound Surface Area (cm^2) 49.35 cm^2 01/29/24 1459   Wound Volume (cm^3) 0 cm^3 01/29/24 1459   Calculated Wound Volume (cm^3) 0 cm^3 01/29/24 1459   Drainage Amount Scant 01/29/24 1459   Drainage Description Serous 01/29/24 1459   Non-staged Wound Description Full thickness 01/29/24 1459   Dressing Status Intact (upon arrival) 01/29/24 1459       Wound 01/22/24 Venous Ulcer Leg  Distal;Left;Posterior (Active)   Wound Image Images linked (Simultaneous filing. User may not have seen previous data.) 01/29/24 1502   Wound Description Brown;Other (Comment) (scab) 01/29/24 1502   Jessica-wound Assessment Dry;Scaly;Edema 01/29/24 1502   Wound Length (cm) 1 cm 01/29/24 1502   Wound Width (cm) 2.2 cm 01/29/24 1502   Wound Depth (cm) 0 cm 01/29/24 1502   Wound Surface Area (cm^2) 2.2 cm^2 01/29/24 1502   Wound Volume (cm^3) 0 cm^3 01/29/24 1502   Calculated Wound Volume (cm^3) 0 cm^3 01/29/24 1502   Change in Wound Size % 100 01/29/24 1502   Drainage Amount None 01/29/24 1502   Non-staged Wound Description Full thickness 01/29/24 1502   Dressing Status Intact (upon arrival) 01/29/24 1502       Wound 01/22/24 Venous Ulcer Leg Distal;Posterior;Right (Active)   Date First Assessed/Time First Assessed: 01/22/24 1450   Primary Wound Type: Venous Ulcer  Location: Leg  Wound Location Orientation: Distal;Posterior;Right       Wound 01/22/24 Venous Ulcer Leg Right;Distal (Active)   Date First Assessed/Time First Assessed: 01/22/24 1451   Primary Wound Type: Venous Ulcer  Location: Leg  Wound Location Orientation: Right;Distal       Wound 01/22/24 Venous Ulcer Leg Distal;Left;Posterior (Active)   Date First Assessed/Time First Assessed: 01/22/24 1517   Primary Wound Type: Venous Ulcer  Location: Leg  Wound Location Orientation: Distal;Left;Posterior       Subjective:      .    HPI    {Common ambulatory SmartLinks:97724}    Review of Systems      Objective:       Wound 01/22/24 Venous Ulcer Leg Distal;Posterior;Right (Active)   Wound Image Images linked (Simultaneous filing. User may not have seen previous data.) 01/29/24 1500   Wound Description Brown;Other (Comment);Epithelialization (scab) 01/29/24 1500   Jessica-wound Assessment Dry;Scaly;Erythema 01/29/24 1500   Wound Length (cm) 7 cm 01/29/24 1500   Wound Width (cm) 7.6 cm 01/29/24 1500   Wound Depth (cm) 0 cm 01/29/24 1500   Wound Surface Area (cm^2) 53.2 cm^2  01/29/24 1500   Wound Volume (cm^3) 0 cm^3 01/29/24 1500   Calculated Wound Volume (cm^3) 0 cm^3 01/29/24 1500   Change in Wound Size % 100 01/29/24 1500   Drainage Amount None 01/29/24 1500   Non-staged Wound Description Full thickness 01/29/24 1500   Dressing Status Intact (upon arrival) 01/29/24 1500       Wound 01/22/24 Venous Ulcer Leg Right;Distal (Active)   Wound Image Images linked (Simultaneous filing. User may not have seen previous data.) 01/29/24 1459   Wound Description Brown;Epithelialization;Other (Comment) (scab) 01/29/24 1459   Jessica-wound Assessment Hyperpigmented;Edema;Dry;Scaly 01/29/24 1459   Wound Length (cm) 10.5 cm 01/29/24 1459   Wound Width (cm) 4.7 cm 01/29/24 1459   Wound Depth (cm) 0 cm 01/29/24 1459   Wound Surface Area (cm^2) 49.35 cm^2 01/29/24 1459   Wound Volume (cm^3) 0 cm^3 01/29/24 1459   Calculated Wound Volume (cm^3) 0 cm^3 01/29/24 1459   Drainage Amount Scant 01/29/24 1459   Drainage Description Serous 01/29/24 1459   Non-staged Wound Description Full thickness 01/29/24 1459   Dressing Status Intact (upon arrival) 01/29/24 1459       Wound 01/22/24 Venous Ulcer Leg Distal;Left;Posterior (Active)   Wound Image Images linked (Simultaneous filing. User may not have seen previous data.) 01/29/24 1502   Wound Description Brown;Other (Comment) (scab) 01/29/24 1502   Jessica-wound Assessment Dry;Scaly;Edema 01/29/24 1502   Wound Length (cm) 1 cm 01/29/24 1502   Wound Width (cm) 2.2 cm 01/29/24 1502   Wound Depth (cm) 0 cm 01/29/24 1502   Wound Surface Area (cm^2) 2.2 cm^2 01/29/24 1502   Wound Volume (cm^3) 0 cm^3 01/29/24 1502   Calculated Wound Volume (cm^3) 0 cm^3 01/29/24 1502   Change in Wound Size % 100 01/29/24 1502   Drainage Amount None 01/29/24 1502   Non-staged Wound Description Full thickness 01/29/24 1502   Dressing Status Intact (upon arrival) 01/29/24 1502       /97   Pulse 92   Temp 98.3 °F (36.8 °C)   Resp 17     Physical Exam      Wound Instructions:  No  orders of the defined types were placed in this encounter.       Diagnosis ICD-10-CM Associated Orders   1. Non-pressure ulcer of lower extremity with fat layer exposed, right ([facility])  L97.912       2. Non-pressure ulcer of lower extremity with fat layer exposed, left ([facility])  L97.922       3. Chronic venous hypertension (idiopathic) with ulcer of left lower extremity (CODE) ([facility])  I87.312       4. Chronic venous hypertension (idiopathic) with ulcer of right lower extremity (CODE) ([facility])  I87.311       5. Lymphedema of both lower extremities  I89.0       6. Lipedema  R60.9

## 2024-02-05 ENCOUNTER — OFFICE VISIT (OUTPATIENT)
Dept: WOUND CARE | Facility: HOSPITAL | Age: 74
End: 2024-02-05
Payer: COMMERCIAL

## 2024-02-05 VITALS
TEMPERATURE: 97.5 F | DIASTOLIC BLOOD PRESSURE: 82 MMHG | SYSTOLIC BLOOD PRESSURE: 136 MMHG | RESPIRATION RATE: 18 BRPM | HEART RATE: 97 BPM

## 2024-02-05 DIAGNOSIS — R60.9 LIPEDEMA: ICD-10-CM

## 2024-02-05 DIAGNOSIS — L97.912 NON-PRESSURE ULCER OF LOWER EXTREMITY WITH FAT LAYER EXPOSED, RIGHT (HCC): Primary | ICD-10-CM

## 2024-02-05 DIAGNOSIS — I87.312 CHRONIC VENOUS HYPERTENSION (IDIOPATHIC) WITH ULCER OF LEFT LOWER EXTREMITY (CODE) (HCC): ICD-10-CM

## 2024-02-05 DIAGNOSIS — I87.311 CHRONIC VENOUS HYPERTENSION (IDIOPATHIC) WITH ULCER OF RIGHT LOWER EXTREMITY (CODE) (HCC): ICD-10-CM

## 2024-02-05 DIAGNOSIS — L97.922 NON-PRESSURE ULCER OF LOWER EXTREMITY WITH FAT LAYER EXPOSED, LEFT (HCC): ICD-10-CM

## 2024-02-05 DIAGNOSIS — I50.32 CHRONIC HEART FAILURE WITH PRESERVED EJECTION FRACTION (HCC): ICD-10-CM

## 2024-02-05 DIAGNOSIS — I89.0 LYMPHEDEMA OF BOTH LOWER EXTREMITIES: ICD-10-CM

## 2024-02-05 PROCEDURE — 97597 DBRDMT OPN WND 1ST 20 CM/<: CPT | Performed by: STUDENT IN AN ORGANIZED HEALTH CARE EDUCATION/TRAINING PROGRAM

## 2024-02-05 PROCEDURE — 97598 DBRDMT OPN WND ADDL 20CM/<: CPT | Performed by: STUDENT IN AN ORGANIZED HEALTH CARE EDUCATION/TRAINING PROGRAM

## 2024-02-05 RX ORDER — LIDOCAINE HYDROCHLORIDE 40 MG/ML
5 SOLUTION TOPICAL ONCE
Status: COMPLETED | OUTPATIENT
Start: 2024-02-05 | End: 2024-02-05

## 2024-02-05 RX ADMIN — LIDOCAINE HYDROCHLORIDE 5 ML: 40 SOLUTION TOPICAL at 15:07

## 2024-02-05 NOTE — PATIENT INSTRUCTIONS
Orders Placed This Encounter   Procedures    Wound cleansing and dressings     Right Lower Leg Wounds:     Wash your hands with soap and water.  Remove old dressing, discard into plastic bag and place in trash.  Cleanse the wound with soap and water prior to applying a clean dressing. Do not use tissue or cotton balls. Do not scrub the wound. Pat dry using gauze.  Shower no - do not get the dressings wet  Apply mild moisturizer to skin surrounding wound( Aveno or Cetaphil lotion)  Apply xeroform to the leg wounds.  Cover with gauze  Secure with coflex lite  Change dressing weekly at wound care center     This was done today     Standing Status:   Future     Standing Expiration Date:   2/5/2025    Wound compression and edema control     Multi-layer compression wrap Instructions: COFLEX LITE     Keep compression wrap/wraps clean and dry. If wraps are too tight and you experience numbness/tingling, call the wound center. If after hours, remove wraps or proceed to nearest E.R. and call wound center in AM.     Wrap will be changed 1 x weekly     Avoid prolonged standing in one place.     Elevate leg(s) above the level of the heart when sitting or as much as possible.     Standing Status:   Future     Standing Expiration Date:   2/5/2025    Wound compression and edema control     Spandgrip G to left leg  Apply in a.m.  Remove at bedtime     Standing Status:   Future     Standing Expiration Date:   2/5/2025

## 2024-02-05 NOTE — PROGRESS NOTES
Patient ID: Mejia Baez is a 73 y.o. male Date of Birth 1950     Chief Complaint  Chief Complaint   Patient presents with    Follow Up Wound Care Visit       Allergies  Amoxicillin    Assessment:     Diagnoses and all orders for this visit:    Non-pressure ulcer of lower extremity with fat layer exposed, right (HCC)  -     Wound cleansing and dressings; Future  -     Wound compression and edema control; Future  -     lidocaine (XYLOCAINE) 4 % topical solution 5 mL  -     Wound compression and edema control; Future    Non-pressure ulcer of lower extremity with fat layer exposed, left (HCC)  -     Wound cleansing and dressings; Future  -     Wound compression and edema control; Future  -     lidocaine (XYLOCAINE) 4 % topical solution 5 mL  -     Wound compression and edema control; Future  -     Debridement  -     Debridement    Chronic venous hypertension (idiopathic) with ulcer of left lower extremity (CODE) ([facility])  -     Wound cleansing and dressings; Future  -     Wound compression and edema control; Future  -     lidocaine (XYLOCAINE) 4 % topical solution 5 mL  -     Wound compression and edema control; Future    Lymphedema of both lower extremities  -     Wound cleansing and dressings; Future  -     Wound compression and edema control; Future  -     lidocaine (XYLOCAINE) 4 % topical solution 5 mL  -     Wound compression and edema control; Future    Chronic venous hypertension (idiopathic) with ulcer of right lower extremity (CODE) ([facility])  -     Wound cleansing and dressings; Future  -     Wound compression and edema control; Future  -     lidocaine (XYLOCAINE) 4 % topical solution 5 mL  -     Wound compression and edema control; Future    Lipedema  -     Wound cleansing and dressings; Future  -     Wound compression and edema control; Future  -     lidocaine (XYLOCAINE) 4 % topical solution 5 mL  -     Wound compression and edema control; Future    Chronic heart failure with preserved ejection fraction  "(HCC)  -     Wound cleansing and dressings; Future  -     Wound compression and edema control; Future  -     lidocaine (XYLOCAINE) 4 % topical solution 5 mL  -     Wound compression and edema control; Future              Debridement   Wound 01/22/24 Venous Ulcer Leg Distal;Posterior;Right    Universal Protocol:  Consent: Verbal consent obtained.  Risks and benefits: risks, benefits and alternatives were discussed  Consent given by: patient  Time out: Immediately prior to procedure a \"time out\" was called to verify the correct patient, procedure, equipment, support staff and site/side marked as required.  Patient identity confirmed: verbally with patient    Debridement Details  Performed by: physician  Debridement type: selective  Pain control: lidocaine 4%      Post-debridement measurements  Length (cm): 9  Width (cm): 7.5  Depth (cm): 0.1  Percent debrided: 90%  Surface Area (cm^2): 67.5  Area Debrided (cm^2): 60.75  Volume (cm^3): 6.75    Devitalized tissue debrided: biofilm and exudate  Instrument(s) utilized: curette  Bleeding: small  Hemostasis obtained with: pressure  Procedural pain (0-10): 1  Post-procedural pain: 0   Response to treatment: procedure was tolerated well    Debridement   Wound 01/22/24 Venous Ulcer Leg Right;Distal    Universal Protocol:  Consent: Verbal consent obtained.  Risks and benefits: risks, benefits and alternatives were discussed  Consent given by: patient  Time out: Immediately prior to procedure a \"time out\" was called to verify the correct patient, procedure, equipment, support staff and site/side marked as required.  Patient identity confirmed: verbally with patient    Debridement Details  Performed by: physician  Debridement type: selective  Pain control: lidocaine 4%      Post-debridement measurements  Length (cm): 5.4  Width (cm): 2  Depth (cm): 0.1  Percent debrided: 90%  Surface Area (cm^2): 10.8  Area Debrided (cm^2): 9.72  Volume (cm^3): 1.08    Devitalized tissue " debrided: biofilm and exudate  Instrument(s) utilized: curette  Bleeding: small  Hemostasis obtained with: pressure  Procedural pain (0-10): 1  Post-procedural pain: 0   Response to treatment: procedure was tolerated well        Plan:   It was a pleasure to see Mejia Baez for wound care follow up today  Selective debridement performed today as above right lower extremity.  Left lower extremity wounds fully epithelialized today.  Patient advised protect newly healed skin the left lower extremity with edema control using compression stockings and moisturization.  Wounds are improving   Continue plan of care as noted below with xeroform Coflex to right lower extremity.  No signs or symptoms of infection today. Patient understands that if any signs of infection start (such as increased redness, drainage, pain, fever, chills, diaphoresis), they should call our office or proceed to the ER or Urgent Care.  Patient should continue a high protein diet to facilitate wound healing  Patient is advised to not submerge wound or leave wound open to air.  Follow up in 1 weeks  Given the multi-factorial nature of wound care, additional time was taken to review patient's treatment plan with other specialties and most recent pertinent lab work and imaging.   All plans of care discussed with patient at bedside who verbalized understanding with treatment plan.    Wound 01/22/24 Venous Ulcer Leg Distal;Posterior;Right (Active)   Wound Image Images linked 02/05/24 1511   Wound Description Brown;Other (Comment);Epithelialization 02/05/24 1511   Jessica-wound Assessment Dry;Scaly;Erythema 02/05/24 1511   Wound Length (cm) 9 cm 02/05/24 1511   Wound Width (cm) 7.5 cm 02/05/24 1511   Wound Depth (cm) 0.1 cm 02/05/24 1511   Wound Surface Area (cm^2) 67.5 cm^2 02/05/24 1511   Wound Volume (cm^3) 6.75 cm^3 02/05/24 1511   Calculated Wound Volume (cm^3) 6.75 cm^3 02/05/24 1511   Change in Wound Size % 35.1 02/05/24 1511   Drainage Amount  Moderate 02/05/24 1511   Drainage Description Serous;Serosanguineous 02/05/24 1511       Wound 01/22/24 Venous Ulcer Leg Right;Distal (Active)   Wound Image Images linked 02/05/24 1510   Wound Description Eschar;Epithelialization;Pink;White 02/05/24 1510   Jessica-wound Assessment Edema;Pink;Dry;Scaly 02/05/24 1510   Wound Length (cm) 5.4 cm 02/05/24 1510   Wound Width (cm) 2 cm 02/05/24 1510   Wound Depth (cm) 0.1 cm 02/05/24 1510   Wound Surface Area (cm^2) 10.8 cm^2 02/05/24 1510   Wound Volume (cm^3) 1.08 cm^3 02/05/24 1510   Calculated Wound Volume (cm^3) 1.08 cm^3 02/05/24 1510   Drainage Amount Scant 02/05/24 1510   Drainage Description Serosanguineous 02/05/24 1510   Non-staged Wound Description Full thickness 02/05/24 1510       Wound 01/22/24 Venous Ulcer Leg Distal;Left;Posterior (Active)   Wound Image Images linked 02/05/24 1510   Wound Description Epithelialization 02/05/24 1510   Jessica-wound Assessment Dry;Scaly;Edema 02/05/24 1510   Wound Length (cm) 0 cm 02/05/24 1510   Wound Width (cm) 0 cm 02/05/24 1510   Wound Depth (cm) 0 cm 02/05/24 1510   Wound Surface Area (cm^2) 0 cm^2 02/05/24 1510   Wound Volume (cm^3) 0 cm^3 02/05/24 1510   Calculated Wound Volume (cm^3) 0 cm^3 02/05/24 1510   Change in Wound Size % 100 02/05/24 1510   Drainage Amount None 02/05/24 1510   Non-staged Wound Description Full thickness 02/05/24 1510       Wound 01/22/24 Venous Ulcer Leg Distal;Posterior;Right (Active)   Date First Assessed/Time First Assessed: 01/22/24 1450   Primary Wound Type: Venous Ulcer  Location: Leg  Wound Location Orientation: Distal;Posterior;Right       Wound 01/22/24 Venous Ulcer Leg Right;Distal (Active)   Date First Assessed/Time First Assessed: 01/22/24 1451   Primary Wound Type: Venous Ulcer  Location: Leg  Wound Location Orientation: Right;Distal       Wound 01/22/24 Venous Ulcer Leg Distal;Left;Posterior (Active)   Date First Assessed/Time First Assessed: 01/22/24 1517   Primary Wound Type: Venous  Ulcer  Location: Leg  Wound Location Orientation: Distal;Left;Posterior       Subjective:      .    2/5/24, 1/29/24: Tolerated coflex wrap well. No symptoms of infeciton     1/22/24: Consult - Mejia is a pleasant 73-year-old male with a past medical history of obesity, HFpEF G1DD, htn, ckd, chronic venous stasis disease here today for initial wound care consult.  Patient was seen in ER on 1/5/24 at [facility] recently for lower extremity edema and presumed cellulitis and was advised to go to see wound care.  Also prescribed 10 days Keflex at that time which he completed.  Unfortunately the wound care office that he lives close to do not take his insurance so he is here today at our office.  States that he has been leaving wounds open to air and they have persisted and gotten worse over the past month.  Denies any symptoms of infection including fever chills diaphoresis.  His lower extremity edema has been a problem and his PCP recently ordered him lymphedema pumps.  He also follows for cardiology and states that they have been having difficulty controlling his blood pressures.  Notes that legs have been getting larger and he has been gaining weight.  Is already following with cardiology.          The following portions of the patient's history were reviewed and updated as appropriate: allergies, current medications, past family history, past medical history, past social history, past surgical history, and problem list.    Review of Systems   Constitutional:  Negative for chills, diaphoresis and fever.   Skin:  Positive for wound.   All other systems reviewed and are negative.        Objective:       Wound 01/22/24 Venous Ulcer Leg Distal;Posterior;Right (Active)   Wound Image Images linked 02/05/24 1511   Wound Description Brown;Other (Comment);Epithelialization 02/05/24 1511   Jessica-wound Assessment Dry;Scaly;Erythema 02/05/24 1511   Wound Length (cm) 9 cm 02/05/24 1511   Wound Width (cm) 7.5 cm 02/05/24  1511   Wound Depth (cm) 0.1 cm 02/05/24 1511   Wound Surface Area (cm^2) 67.5 cm^2 02/05/24 1511   Wound Volume (cm^3) 6.75 cm^3 02/05/24 1511   Calculated Wound Volume (cm^3) 6.75 cm^3 02/05/24 1511   Change in Wound Size % 35.1 02/05/24 1511   Drainage Amount Moderate 02/05/24 1511   Drainage Description Serous;Serosanguineous 02/05/24 1511       Wound 01/22/24 Venous Ulcer Leg Right;Distal (Active)   Wound Image Images linked 02/05/24 1510   Wound Description Eschar;Epithelialization;Pink;White 02/05/24 1510   Jessica-wound Assessment Edema;Pink;Dry;Scaly 02/05/24 1510   Wound Length (cm) 5.4 cm 02/05/24 1510   Wound Width (cm) 2 cm 02/05/24 1510   Wound Depth (cm) 0.1 cm 02/05/24 1510   Wound Surface Area (cm^2) 10.8 cm^2 02/05/24 1510   Wound Volume (cm^3) 1.08 cm^3 02/05/24 1510   Calculated Wound Volume (cm^3) 1.08 cm^3 02/05/24 1510   Drainage Amount Scant 02/05/24 1510   Drainage Description Serosanguineous 02/05/24 1510   Non-staged Wound Description Full thickness 02/05/24 1510       Wound 01/22/24 Venous Ulcer Leg Distal;Left;Posterior (Active)   Wound Image Images linked 02/05/24 1510   Wound Description Epithelialization 02/05/24 1510   Jessica-wound Assessment Dry;Scaly;Edema 02/05/24 1510   Wound Length (cm) 0 cm 02/05/24 1510   Wound Width (cm) 0 cm 02/05/24 1510   Wound Depth (cm) 0 cm 02/05/24 1510   Wound Surface Area (cm^2) 0 cm^2 02/05/24 1510   Wound Volume (cm^3) 0 cm^3 02/05/24 1510   Calculated Wound Volume (cm^3) 0 cm^3 02/05/24 1510   Change in Wound Size % 100 02/05/24 1510   Drainage Amount None 02/05/24 1510   Non-staged Wound Description Full thickness 02/05/24 1510       /82   Pulse 97   Temp 97.5 °F (36.4 °C)   Resp 18     Physical Exam  Vitals reviewed.   Constitutional:       Appearance: Normal appearance.   HENT:      Head: Normocephalic and atraumatic.   Eyes:      Extraocular Movements: Extraocular movements intact.   Pulmonary:      Effort: Pulmonary effort is normal.    Musculoskeletal:      Cervical back: Neck supple.      Right lower leg: Edema present.      Left lower leg: Edema present.   Skin:     Comments: Extremity wound fully epithelialized.  No open wound or exudate.    Lateral and posterior right lower extremity wound with dried exudate on it.  Overall skin is very dry.  This is debrided today to reveal healthy wound base.   Neurological:      Mental Status: He is alert.   Psychiatric:         Mood and Affect: Mood normal.                     Wound Instructions:  Orders Placed This Encounter   Procedures    Wound cleansing and dressings     Right Lower Leg Wounds:     Wash your hands with soap and water.  Remove old dressing, discard into plastic bag and place in trash.  Cleanse the wound with soap and water prior to applying a clean dressing. Do not use tissue or cotton balls. Do not scrub the wound. Pat dry using gauze.  Shower no - do not get the dressings wet  Apply mild moisturizer to skin surrounding wound( Aveno or Cetaphil lotion)  Apply xeroform to the leg wounds.  Cover with gauze  Secure with coflex lite  Change dressing weekly at wound care center     This was done today     Standing Status:   Future     Standing Expiration Date:   2/5/2025    Wound compression and edema control     Multi-layer compression wrap Instructions: COFLEX LITE     Keep compression wrap/wraps clean and dry. If wraps are too tight and you experience numbness/tingling, call the wound center. If after hours, remove wraps or proceed to nearest E.R. and call wound center in AM.     Wrap will be changed 1 x weekly     Avoid prolonged standing in one place.     Elevate leg(s) above the level of the heart when sitting or as much as possible.     Standing Status:   Future     Standing Expiration Date:   2/5/2025    Wound compression and edema control     Spandgrip G to left leg  Apply in a.m.  Remove at bedtime     Standing Status:   Future     Standing Expiration Date:   2/5/2025     "Debridement     This order was created via procedure documentation    Debridement     This order was created via procedure documentation        Diagnosis ICD-10-CM Associated Orders   1. Non-pressure ulcer of lower extremity with fat layer exposed, right ([facility])  L97.912 Wound cleansing and dressings     Wound compression and edema control     lidocaine (XYLOCAINE) 4 % topical solution 5 mL     Wound compression and edema control      2. Non-pressure ulcer of lower extremity with fat layer exposed, left ([facility])  L97.922 Wound cleansing and dressings     Wound compression and edema control     lidocaine (XYLOCAINE) 4 % topical solution 5 mL     Wound compression and edema control     Debridement     Debridement      3. Chronic venous hypertension (idiopathic) with ulcer of left lower extremity (CODE) ([facility])  I87.312 Wound cleansing and dressings     Wound compression and edema control     lidocaine (XYLOCAINE) 4 % topical solution 5 mL     Wound compression and edema control      4. Lymphedema of both lower extremities  I89.0 Wound cleansing and dressings     Wound compression and edema control     lidocaine (XYLOCAINE) 4 % topical solution 5 mL     Wound compression and edema control      5. Chronic venous hypertension (idiopathic) with ulcer of right lower extremity (CODE) ([facility])  I87.311 Wound cleansing and dressings     Wound compression and edema control     lidocaine (XYLOCAINE) 4 % topical solution 5 mL     Wound compression and edema control      6. Lipedema  R60.9 Wound cleansing and dressings     Wound compression and edema control     lidocaine (XYLOCAINE) 4 % topical solution 5 mL     Wound compression and edema control      7. Chronic heart failure with preserved ejection fraction ([facility])  I50.32 Wound cleansing and dressings     Wound compression and edema control     lidocaine (XYLOCAINE) 4 % topical solution 5 mL     Wound compression and edema control          --  Katlyn Rossi MD    \"This note has been " "constructed using a voice recognition system. Therefore there may be syntax, spelling, and/or grammatical errors. Occasional wrong word or \"sound alike\" substitutions may have occurred due to the inherent limitations of voice recognition software. Read the chart carefully and recognize, using context, where substitutions have occurred. Please call if you have any questions.\"     "

## 2024-02-12 ENCOUNTER — OFFICE VISIT (OUTPATIENT)
Dept: WOUND CARE | Facility: HOSPITAL | Age: 74
End: 2024-02-12
Payer: COMMERCIAL

## 2024-02-12 VITALS
RESPIRATION RATE: 18 BRPM | TEMPERATURE: 97.9 F | SYSTOLIC BLOOD PRESSURE: 141 MMHG | DIASTOLIC BLOOD PRESSURE: 81 MMHG | HEART RATE: 96 BPM

## 2024-02-12 DIAGNOSIS — I89.0 LYMPHEDEMA OF BOTH LOWER EXTREMITIES: ICD-10-CM

## 2024-02-12 DIAGNOSIS — L97.912 NON-PRESSURE ULCER OF LOWER EXTREMITY WITH FAT LAYER EXPOSED, RIGHT (HCC): Primary | ICD-10-CM

## 2024-02-12 DIAGNOSIS — R60.9 LIPEDEMA: ICD-10-CM

## 2024-02-12 DIAGNOSIS — I87.311 CHRONIC VENOUS HYPERTENSION (IDIOPATHIC) WITH ULCER OF RIGHT LOWER EXTREMITY (CODE) (HCC): ICD-10-CM

## 2024-02-12 PROCEDURE — 97597 DBRDMT OPN WND 1ST 20 CM/<: CPT | Performed by: STUDENT IN AN ORGANIZED HEALTH CARE EDUCATION/TRAINING PROGRAM

## 2024-02-12 RX ORDER — LIDOCAINE HYDROCHLORIDE 40 MG/ML
5 SOLUTION TOPICAL ONCE
Status: COMPLETED | OUTPATIENT
Start: 2024-02-12 | End: 2024-02-12

## 2024-02-12 RX ADMIN — LIDOCAINE HYDROCHLORIDE 5 ML: 40 SOLUTION TOPICAL at 15:17

## 2024-02-12 NOTE — PATIENT INSTRUCTIONS
Orders Placed This Encounter   Procedures    Wound compression and edema control     Multi-layer compression wrap Instructions: COFLEX LITE Keep compression wrap/wraps clean and dry. If wraps are too tight and you experience numbness/tingling, call the wound center. If after hours, remove wraps or proceed to nearest E.R. and call wound center in AM. Wrap will be changed 1 x weekly Avoid prolonged standing in one place. Elevate leg(s) above the level of the heart when sitting or as much as possible.     Standing Status:   Future     Standing Expiration Date:   2/12/2025    Wound cleansing and dressings     Right Lower Leg Wounds: Wash your hands with soap and water. Remove old dressing, discard into plastic bag and place in trash. Cleanse the wound with soap and water prior to applying a clean dressing. Do not use tissue or cotton balls. Do not scrub the wound. Pat dry using gauze. Shower no - do not get the dressings wet Apply mild moisturizer to skin surrounding wound( Aveno or Cetaphil lotion) Apply dermagran to the leg wounds. Cover with gauze Secure with coflex lite Change dressing weekly at wound care center This was done today     Standing Status:   Future     Standing Expiration Date:   2/12/2025

## 2024-02-12 NOTE — PROGRESS NOTES
"Patient ID: Mejia Baez is a 73 y.o. male Date of Birth 1950     Chief Complaint  Chief Complaint   Patient presents with    Follow Up Wound Care Visit     Bilateral leg wounds       Allergies  Amoxicillin    Assessment:     Diagnoses and all orders for this visit:    Non-pressure ulcer of lower extremity with fat layer exposed, right (HCC)  -     lidocaine (XYLOCAINE) 4 % topical solution 5 mL  -     Cancel: Wound cleansing and dressings; Future  -     Wound compression and edema control; Future  -     Wound cleansing and dressings; Future  -     Debridement  -     Debridement    Chronic venous hypertension (idiopathic) with ulcer of right lower extremity (CODE) ([facility])  -     lidocaine (XYLOCAINE) 4 % topical solution 5 mL  -     Cancel: Wound cleansing and dressings; Future  -     Wound compression and edema control; Future  -     Wound cleansing and dressings; Future    Lipedema  -     lidocaine (XYLOCAINE) 4 % topical solution 5 mL  -     Cancel: Wound cleansing and dressings; Future  -     Wound compression and edema control; Future  -     Wound cleansing and dressings; Future    Lymphedema of both lower extremities  -     lidocaine (XYLOCAINE) 4 % topical solution 5 mL  -     Cancel: Wound cleansing and dressings; Future  -     Wound compression and edema control; Future  -     Wound cleansing and dressings; Future              Debridement   Wound 01/22/24 Venous Ulcer Leg Distal;Posterior;Right    Universal Protocol:  Consent: Verbal consent obtained.  Risks and benefits: risks, benefits and alternatives were discussed  Consent given by: patient  Time out: Immediately prior to procedure a \"time out\" was called to verify the correct patient, procedure, equipment, support staff and site/side marked as required.  Patient identity confirmed: verbally with patient    Debridement Details  Performed by: physician  Debridement type: selective  Pain control: lidocaine 4%      Post-debridement " "measurements  Length (cm): 2.3  Width (cm): 2.5  Depth (cm): 0.1  Percent debrided: 90%  Surface Area (cm^2): 5.75  Area Debrided (cm^2): 5.18  Volume (cm^3): 0.58    Devitalized tissue debrided: biofilm and exudate  Instrument(s) utilized: curette  Bleeding: small  Hemostasis obtained with: pressure  Procedural pain (0-10): 1  Post-procedural pain: 0   Response to treatment: procedure was tolerated well    Debridement   Wound 01/22/24 Venous Ulcer Leg Right;Distal    Universal Protocol:  Consent: Verbal consent obtained.  Risks and benefits: risks, benefits and alternatives were discussed  Consent given by: patient  Time out: Immediately prior to procedure a \"time out\" was called to verify the correct patient, procedure, equipment, support staff and site/side marked as required.  Patient identity confirmed: verbally with patient    Debridement Details  Performed by: physician  Debridement type: selective  Pain control: lidocaine 4%      Post-debridement measurements  Length (cm): 4.3  Width (cm): 0.8  Depth (cm): 0.1  Percent debrided: 90%  Surface Area (cm^2): 3.44  Area Debrided (cm^2): 3.1  Volume (cm^3): 0.34    Devitalized tissue debrided: biofilm and exudate  Instrument(s) utilized: curette  Bleeding: small  Hemostasis obtained with: pressure  Procedural pain (0-10): 1  Post-procedural pain: 0   Response to treatment: procedure was tolerated well        Plan:   It was a pleasure to see Mejia Baez for wound care follow up today  Selective debridement performed today as above  Wound is improving   Continue plan of care as noted below with dermagran, coflex  No signs or symptoms of infection today. Patient understands that if any signs of infection start (such as increased redness, drainage, pain, fever, chills, diaphoresis), they should call our office or proceed to the ER or Urgent Care.  Patient should continue a high protein diet to facilitate wound healing  Patient is advised to not submerge wound or " leave wound open to air.  Follow up in 1 weeks  Given the multi-factorial nature of wound care, additional time was taken to review patient's treatment plan with other specialties and most recent pertinent lab work and imaging.   All plans of care discussed with patient at bedside who verbalized understanding with treatment plan.    Wound 01/22/24 Venous Ulcer Leg Distal;Posterior;Right (Active)   Wound Image Images linked 02/12/24 1515   Wound Description Brown;Yellow;Pink;Epithelialization 02/12/24 1515   Jessica-wound Assessment Dry;Scaly;Erythema 02/12/24 1515   Wound Length (cm) 2.3 cm 02/12/24 1515   Wound Width (cm) 2.5 cm 02/12/24 1515   Wound Depth (cm) 0.1 cm 02/12/24 1515   Wound Surface Area (cm^2) 5.75 cm^2 02/12/24 1515   Wound Volume (cm^3) 0.575 cm^3 02/12/24 1515   Calculated Wound Volume (cm^3) 0.58 cm^3 02/12/24 1515   Change in Wound Size % 94.42 02/12/24 1515   Drainage Amount Small 02/12/24 1515   Drainage Description Serous;Serosanguineous 02/12/24 1515   Non-staged Wound Description Full thickness 02/12/24 1515   Dressing Status Intact 02/12/24 1515       Wound 01/22/24 Venous Ulcer Leg Right;Distal (Active)   Wound Image Images linked 02/12/24 1514   Wound Description Brown;Epithelialization;Yellow;Pink 02/12/24 1514   Jessica-wound Assessment Edema;Pink;Dry;Scaly 02/12/24 1514   Wound Length (cm) 4.3 cm 02/12/24 1514   Wound Width (cm) 0.89 cm 02/12/24 1514   Wound Depth (cm) 0.1 cm 02/12/24 1514   Wound Surface Area (cm^2) 3.827 cm^2 02/12/24 1514   Wound Volume (cm^3) 0.3827 cm^3 02/12/24 1514   Calculated Wound Volume (cm^3) 0.38 cm^3 02/12/24 1514   Drainage Amount Small 02/12/24 1514   Drainage Description Serosanguineous 02/12/24 1514   Non-staged Wound Description Full thickness 02/12/24 1514   Dressing Status Intact 02/12/24 1514       Wound 01/22/24 Venous Ulcer Leg Distal;Posterior;Right (Active)   Date First Assessed/Time First Assessed: 01/22/24 1450   Primary Wound Type: Venous Ulcer   Location: Leg  Wound Location Orientation: Distal;Posterior;Right       Wound 01/22/24 Venous Ulcer Leg Right;Distal (Active)   Date First Assessed/Time First Assessed: 01/22/24 1451   Primary Wound Type: Venous Ulcer  Location: Leg  Wound Location Orientation: Right;Distal       [REMOVED] Wound 01/22/24 Venous Ulcer Leg Distal;Left;Posterior (Removed)   Resolved Date: 02/05/24  Date First Assessed/Time First Assessed: 01/22/24 1517   Primary Wound Type: Venous Ulcer  Location: Leg  Wound Location Orientation: Distal;Left;Posterior  Wound Outcome: Healed       Subjective:      .    2/12/24, 2/5/24, 1/29/24: Tolerated coflex wrap well. No symptoms of infeciton     1/22/24: Consult - Mejia is a pleasant 73-year-old male with a past medical history of obesity, HFpEF G1DD, htn, ckd, chronic venous stasis disease here today for initial wound care consult.  Patient was seen in ER on 1/5/24 at [facility] recently for lower extremity edema and presumed cellulitis and was advised to go to see wound care.  Also prescribed 10 days Keflex at that time which he completed.  Unfortunately the wound care office that he lives close to do not take his insurance so he is here today at our office.  States that he has been leaving wounds open to air and they have persisted and gotten worse over the past month.  Denies any symptoms of infection including fever chills diaphoresis.  His lower extremity edema has been a problem and his PCP recently ordered him lymphedema pumps.  He also follows for cardiology and states that they have been having difficulty controlling his blood pressures.  Notes that legs have been getting larger and he has been gaining weight.  Is already following with cardiology.          The following portions of the patient's history were reviewed and updated as appropriate: allergies, current medications, past family history, past medical history, past social history, past surgical history, and problem  list.    Review of Systems   Constitutional:  Negative for chills, diaphoresis and fever.   Skin:  Positive for wound.   All other systems reviewed and are negative.        Objective:       Wound 01/22/24 Venous Ulcer Leg Distal;Posterior;Right (Active)   Wound Image Images linked 02/12/24 1515   Wound Description Brown;Yellow;Pink;Epithelialization 02/12/24 1515   Jessica-wound Assessment Dry;Scaly;Erythema 02/12/24 1515   Wound Length (cm) 2.3 cm 02/12/24 1515   Wound Width (cm) 2.5 cm 02/12/24 1515   Wound Depth (cm) 0.1 cm 02/12/24 1515   Wound Surface Area (cm^2) 5.75 cm^2 02/12/24 1515   Wound Volume (cm^3) 0.575 cm^3 02/12/24 1515   Calculated Wound Volume (cm^3) 0.58 cm^3 02/12/24 1515   Change in Wound Size % 94.42 02/12/24 1515   Drainage Amount Small 02/12/24 1515   Drainage Description Serous;Serosanguineous 02/12/24 1515   Non-staged Wound Description Full thickness 02/12/24 1515   Dressing Status Intact 02/12/24 1515       Wound 01/22/24 Venous Ulcer Leg Right;Distal (Active)   Wound Image Images linked 02/12/24 1514   Wound Description Brown;Epithelialization;Yellow;Pink 02/12/24 1514   Jessica-wound Assessment Edema;Pink;Dry;Scaly 02/12/24 1514   Wound Length (cm) 4.3 cm 02/12/24 1514   Wound Width (cm) 0.89 cm 02/12/24 1514   Wound Depth (cm) 0.1 cm 02/12/24 1514   Wound Surface Area (cm^2) 3.827 cm^2 02/12/24 1514   Wound Volume (cm^3) 0.3827 cm^3 02/12/24 1514   Calculated Wound Volume (cm^3) 0.38 cm^3 02/12/24 1514   Drainage Amount Small 02/12/24 1514   Drainage Description Serosanguineous 02/12/24 1514   Non-staged Wound Description Full thickness 02/12/24 1514   Dressing Status Intact 02/12/24 1514       /81   Pulse 96   Temp 97.9 °F (36.6 °C)   Resp 18     Physical Exam  Vitals reviewed.   Constitutional:       Appearance: Normal appearance.   HENT:      Head: Normocephalic and atraumatic.   Eyes:      Extraocular Movements: Extraocular movements intact.   Pulmonary:      Effort: Pulmonary  effort is normal.   Musculoskeletal:      Cervical back: Neck supple.      Right lower leg: Edema present.      Left lower leg: Edema present.   Skin:     Comments: 2 small discrete wounds of distal lateral right lower extremity.  Smaller than last exam.  Healthy wound beds.  No signs of infection.   Neurological:      Mental Status: He is alert.   Psychiatric:         Mood and Affect: Mood normal.                 Wound Instructions:  Orders Placed This Encounter   Procedures    Wound compression and edema control     Multi-layer compression wrap Instructions: COFLEX LITE Keep compression wrap/wraps clean and dry. If wraps are too tight and you experience numbness/tingling, call the wound center. If after hours, remove wraps or proceed to nearest E.R. and call wound center in AM. Wrap will be changed 1 x weekly Avoid prolonged standing in one place. Elevate leg(s) above the level of the heart when sitting or as much as possible.     Standing Status:   Future     Standing Expiration Date:   2/12/2025    Wound cleansing and dressings     Right Lower Leg Wounds: Wash your hands with soap and water. Remove old dressing, discard into plastic bag and place in trash. Cleanse the wound with soap and water prior to applying a clean dressing. Do not use tissue or cotton balls. Do not scrub the wound. Pat dry using gauze. Shower no - do not get the dressings wet Apply mild moisturizer to skin surrounding wound( Aveno or Cetaphil lotion) Apply dermagran to the leg wounds. Cover with gauze Secure with coflex lite Change dressing weekly at wound care center This was done today     Standing Status:   Future     Standing Expiration Date:   2/12/2025    Debridement     This order was created via procedure documentation    Debridement     This order was created via procedure documentation        Diagnosis ICD-10-CM Associated Orders   1. Non-pressure ulcer of lower extremity with fat layer exposed, right ([facility])  L97.912 lidocaine  "(XYLOCAINE) 4 % topical solution 5 mL     Wound compression and edema control     Wound cleansing and dressings     Debridement     Debridement      2. Chronic venous hypertension (idiopathic) with ulcer of right lower extremity (CODE) ([facility])  I87.311 lidocaine (XYLOCAINE) 4 % topical solution 5 mL     Wound compression and edema control     Wound cleansing and dressings      3. Lipedema  R60.9 lidocaine (XYLOCAINE) 4 % topical solution 5 mL     Wound compression and edema control     Wound cleansing and dressings      4. Lymphedema of both lower extremities  I89.0 lidocaine (XYLOCAINE) 4 % topical solution 5 mL     Wound compression and edema control     Wound cleansing and dressings          --  Katlyn Rossi MD    \"This note has been constructed using a voice recognition system. Therefore there may be syntax, spelling, and/or grammatical errors. Occasional wrong word or \"sound alike\" substitutions may have occurred due to the inherent limitations of voice recognition software. Read the chart carefully and recognize, using context, where substitutions have occurred. Please call if you have any questions.\"     "

## 2024-02-19 ENCOUNTER — OFFICE VISIT (OUTPATIENT)
Dept: WOUND CARE | Facility: HOSPITAL | Age: 74
End: 2024-02-19
Payer: COMMERCIAL

## 2024-02-19 VITALS — SYSTOLIC BLOOD PRESSURE: 144 MMHG | RESPIRATION RATE: 15 BRPM | DIASTOLIC BLOOD PRESSURE: 89 MMHG | HEART RATE: 96 BPM

## 2024-02-19 DIAGNOSIS — I89.0 LYMPHEDEMA OF BOTH LOWER EXTREMITIES: ICD-10-CM

## 2024-02-19 DIAGNOSIS — I87.311 CHRONIC VENOUS HYPERTENSION (IDIOPATHIC) WITH ULCER OF RIGHT LOWER EXTREMITY (CODE) (HCC): ICD-10-CM

## 2024-02-19 DIAGNOSIS — R60.9 LIPEDEMA: ICD-10-CM

## 2024-02-19 DIAGNOSIS — L97.912 NON-PRESSURE ULCER OF LOWER EXTREMITY WITH FAT LAYER EXPOSED, RIGHT (HCC): Primary | ICD-10-CM

## 2024-02-19 PROCEDURE — 99213 OFFICE O/P EST LOW 20 MIN: CPT | Performed by: STUDENT IN AN ORGANIZED HEALTH CARE EDUCATION/TRAINING PROGRAM

## 2024-02-19 RX ORDER — HYDROCHLOROTHIAZIDE 25 MG/1
25 TABLET ORAL EVERY 24 HOURS
COMMUNITY
Start: 2024-02-06

## 2024-02-19 NOTE — PATIENT INSTRUCTIONS
Orders Placed This Encounter   Procedures    Wound cleansing and dressings     Right Lower Leg Wounds are healed.    Shower: Yes  Moisturize your legs daily.    Obtain compression socks (15-20 mm Hg) and wear daily to right and left lower legs.    You are discharged from the Wound Center.  If you have any issues, please call the Wound Center.     Standing Status:   Future     Standing Expiration Date:   2/19/2025    Wound compression and edema control     Compression Stocking  15-20 mm Hg to right and left lower leg.        Remove compression stockings every night at bedtime and re-apply first thing every morning. Follow daily skin care as instructed.    Avoid prolonged standing in one place.    Elevate leg(s) above the level of the heart when sitting or as much as possible.    (Spandagrip was applied today)     Standing Status:   Future     Standing Expiration Date:   2/19/2025

## 2024-02-19 NOTE — PROGRESS NOTES
Patient ID: Mejia Baez is a 73 y.o. male Date of Birth 1950     Chief Complaint  Chief Complaint   Patient presents with    Follow Up Wound Care Visit     RLE       Allergies  Amoxicillin    Assessment:     Diagnoses and all orders for this visit:    Non-pressure ulcer of lower extremity with fat layer exposed, right (HCC)  -     Wound cleansing and dressings; Future  -     Wound compression and edema control; Future    Chronic venous hypertension (idiopathic) with ulcer of right lower extremity (CODE) (HCC)  -     Wound cleansing and dressings; Future  -     Wound compression and edema control; Future    Lipedema  -     Wound cleansing and dressings; Future  -     Wound compression and edema control; Future    Lymphedema of both lower extremities  -     Wound cleansing and dressings; Future  -     Wound compression and edema control; Future    Other orders  -     hydroCHLOROthiazide 25 mg tablet; 25 mg every 24 hours            Plan:   It was a pleasure to see Mejia aBez for wound care follow up today  RLE wounds fully epithelialized today. Patient advised to protect newly healed skin with edema control and moisturization  Discharge from wound management today with follow up as needed in the future.  All plans of care discussed with patient at bedside who verbalized understanding with treatment plan.    Wound 01/22/24 Venous Ulcer Leg Distal;Posterior;Right (Active)   Wound Image Images linked 02/19/24 1532   Wound Description Pink;Dry;Epithelialization 02/19/24 1531   Jessica-wound Assessment Dry;Scaly;Pink 02/19/24 1531   Wound Length (cm) 0 cm 02/19/24 1531   Wound Width (cm) 0 cm 02/19/24 1531   Wound Depth (cm) 0 cm 02/19/24 1531   Wound Surface Area (cm^2) 0 cm^2 02/19/24 1531   Wound Volume (cm^3) 0 cm^3 02/19/24 1531   Calculated Wound Volume (cm^3) 0 cm^3 02/19/24 1531   Change in Wound Size % 100 02/19/24 1531   Drainage Amount None 02/19/24 1531   Non-staged Wound Description Not applicable  02/19/24 1531   Dressing Status Intact (upon arrival) 02/19/24 1531       Wound 01/22/24 Venous Ulcer Leg Right;Distal (Active)   Wound Image Images linked 02/19/24 1530   Wound Description Pink;Epithelialization 02/19/24 1529   Jessica-wound Assessment Edema;Pink;Dry;Scaly 02/19/24 1529   Wound Length (cm) 0 cm 02/19/24 1529   Wound Width (cm) 0 cm 02/19/24 1529   Wound Depth (cm) 0 cm 02/19/24 1529   Wound Surface Area (cm^2) 0 cm^2 02/19/24 1529   Wound Volume (cm^3) 0 cm^3 02/19/24 1529   Calculated Wound Volume (cm^3) 0 cm^3 02/19/24 1529   Drainage Amount None 02/19/24 1529   Non-staged Wound Description Not applicable 02/19/24 1529   Dressing Status Intact (upon arrival) 02/19/24 1529       Wound 01/22/24 Venous Ulcer Leg Distal;Posterior;Right (Active)   Date First Assessed/Time First Assessed: 01/22/24 1450   Primary Wound Type: Venous Ulcer  Location: Leg  Wound Location Orientation: Distal;Posterior;Right  Wound Outcome: Healed       Wound 01/22/24 Venous Ulcer Leg Right;Distal (Active)   Date First Assessed/Time First Assessed: 01/22/24 1451   Primary Wound Type: Venous Ulcer  Location: Leg  Wound Location Orientation: Right;Distal  Wound Outcome: Healed       [REMOVED] Wound 01/22/24 Venous Ulcer Leg Distal;Left;Posterior (Removed)   Resolved Date: 02/05/24  Date First Assessed/Time First Assessed: 01/22/24 1517   Primary Wound Type: Venous Ulcer  Location: Leg  Wound Location Orientation: Distal;Left;Posterior  Wound Outcome: Healed       Subjective:      .    2/19/24, 2/12/24, 2/5/24, 1/29/24: Tolerated coflex wrap well. No symptoms of infeciton     1/22/24: Consult - Mejia is a pleasant 73-year-old male with a past medical history of obesity, HFpEF G1DD, htn, ckd, chronic venous stasis disease here today for initial wound care consult.  Patient was seen in ER on 1/5/24 at [facility] recently for lower extremity edema and presumed cellulitis and was advised to go to see wound care.  Also  prescribed 10 days Keflex at that time which he completed.  Unfortunately the wound care office that he lives close to do not take his insurance so he is here today at our office.  States that he has been leaving wounds open to air and they have persisted and gotten worse over the past month.  Denies any symptoms of infection including fever chills diaphoresis.  His lower extremity edema has been a problem and his PCP recently ordered him lymphedema pumps.  He also follows for cardiology and states that they have been having difficulty controlling his blood pressures.  Notes that legs have been getting larger and he has been gaining weight.  Is already following with cardiology.             The following portions of the patient's history were reviewed and updated as appropriate: allergies, current medications, past family history, past medical history, past social history, past surgical history, and problem list.    Review of Systems   Constitutional:  Negative for chills, diaphoresis and fever.   Skin:  Negative for wound.   All other systems reviewed and are negative.        Objective:       Wound 01/22/24 Venous Ulcer Leg Distal;Posterior;Right (Active)   Wound Image Images linked 02/19/24 1532   Wound Description Pink;Dry;Epithelialization 02/19/24 1531   Jessica-wound Assessment Dry;Scaly;Pink 02/19/24 1531   Wound Length (cm) 0 cm 02/19/24 1531   Wound Width (cm) 0 cm 02/19/24 1531   Wound Depth (cm) 0 cm 02/19/24 1531   Wound Surface Area (cm^2) 0 cm^2 02/19/24 1531   Wound Volume (cm^3) 0 cm^3 02/19/24 1531   Calculated Wound Volume (cm^3) 0 cm^3 02/19/24 1531   Change in Wound Size % 100 02/19/24 1531   Drainage Amount None 02/19/24 1531   Non-staged Wound Description Not applicable 02/19/24 1531   Dressing Status Intact (upon arrival) 02/19/24 1531       Wound 01/22/24 Venous Ulcer Leg Right;Distal (Active)   Wound Image Images linked 02/19/24 1530   Wound Description Pink;Epithelialization 02/19/24 1529    Jessica-wound Assessment Edema;Pink;Dry;Scaly 02/19/24 1529   Wound Length (cm) 0 cm 02/19/24 1529   Wound Width (cm) 0 cm 02/19/24 1529   Wound Depth (cm) 0 cm 02/19/24 1529   Wound Surface Area (cm^2) 0 cm^2 02/19/24 1529   Wound Volume (cm^3) 0 cm^3 02/19/24 1529   Calculated Wound Volume (cm^3) 0 cm^3 02/19/24 1529   Drainage Amount None 02/19/24 1529   Non-staged Wound Description Not applicable 02/19/24 1529   Dressing Status Intact (upon arrival) 02/19/24 1529       /89   Pulse 96   Resp 15     Physical Exam  Vitals reviewed.   Constitutional:       Appearance: Normal appearance.   HENT:      Head: Normocephalic and atraumatic.   Eyes:      Extraocular Movements: Extraocular movements intact.   Pulmonary:      Effort: Pulmonary effort is normal.   Musculoskeletal:      Cervical back: Neck supple.      Right lower leg: Edema present.   Skin:     Comments: RLE wounds fully epithelialized. No open wounds or exudate.   Neurological:      Mental Status: He is alert.   Psychiatric:         Mood and Affect: Mood normal.                 Wound Instructions:  Orders Placed This Encounter   Procedures    Wound cleansing and dressings     Right Lower Leg Wounds are healed.    Shower: Yes  Moisturize your legs daily.    Obtain compression socks (15-20 mm Hg) and wear daily to right and left lower legs.    You are discharged from the Wound Center.  If you have any issues, please call the Wound Center.     Standing Status:   Future     Standing Expiration Date:   2/19/2025    Wound compression and edema control     Compression Stocking  15-20 mm Hg to right and left lower leg.        Remove compression stockings every night at bedtime and re-apply first thing every morning. Follow daily skin care as instructed.    Avoid prolonged standing in one place.    Elevate leg(s) above the level of the heart when sitting or as much as possible.    (Spandagrip was applied today)     Standing Status:   Future     Standing  "Expiration Date:   2/19/2025        Diagnosis ICD-10-CM Associated Orders   1. Non-pressure ulcer of lower extremity with fat layer exposed, right ([facility])  L97.912 Wound cleansing and dressings     Wound compression and edema control      2. Chronic venous hypertension (idiopathic) with ulcer of right lower extremity (CODE) ([facility])  I87.311 Wound cleansing and dressings     Wound compression and edema control      3. Lipedema  R60.9 Wound cleansing and dressings     Wound compression and edema control      4. Lymphedema of both lower extremities  I89.0 Wound cleansing and dressings     Wound compression and edema control        --  Katlyn Rossi MD    \"This note has been constructed using a voice recognition system. Therefore there may be syntax, spelling, and/or grammatical errors. Occasional wrong word or \"sound alike\" substitutions may have occurred due to the inherent limitations of voice recognition software. Read the chart carefully and recognize, using context, where substitutions have occurred. Please call if you have any questions.\"       "

## 2024-03-12 ENCOUNTER — OFFICE VISIT (OUTPATIENT)
Dept: WOUND CARE | Facility: HOSPITAL | Age: 74
End: 2024-03-12
Payer: COMMERCIAL

## 2024-03-12 VITALS
SYSTOLIC BLOOD PRESSURE: 179 MMHG | TEMPERATURE: 97.4 F | DIASTOLIC BLOOD PRESSURE: 90 MMHG | HEART RATE: 79 BPM | RESPIRATION RATE: 20 BRPM

## 2024-03-12 DIAGNOSIS — I50.9 ACUTE ON CHRONIC HEART FAILURE, UNSPECIFIED HEART FAILURE TYPE (HCC): ICD-10-CM

## 2024-03-12 DIAGNOSIS — I89.0 LYMPHEDEMA OF BOTH LOWER EXTREMITIES: ICD-10-CM

## 2024-03-12 DIAGNOSIS — I87.311 CHRONIC VENOUS HYPERTENSION (IDIOPATHIC) WITH ULCER OF RIGHT LOWER EXTREMITY (CODE) (HCC): ICD-10-CM

## 2024-03-12 DIAGNOSIS — L97.912 NON-PRESSURE ULCER OF LOWER EXTREMITY WITH FAT LAYER EXPOSED, RIGHT (HCC): Primary | ICD-10-CM

## 2024-03-12 DIAGNOSIS — I87.312 CHRONIC VENOUS HYPERTENSION (IDIOPATHIC) WITH ULCER OF LEFT LOWER EXTREMITY (CODE) (HCC): ICD-10-CM

## 2024-03-12 DIAGNOSIS — L97.921 NON-PRESSURE CHRONIC ULCER OF LEFT LOWER LEG, LIMITED TO BREAKDOWN OF SKIN (HCC): ICD-10-CM

## 2024-03-12 DIAGNOSIS — R60.9 LIPEDEMA: ICD-10-CM

## 2024-03-12 PROCEDURE — 99214 OFFICE O/P EST MOD 30 MIN: CPT | Performed by: STUDENT IN AN ORGANIZED HEALTH CARE EDUCATION/TRAINING PROGRAM

## 2024-03-12 PROCEDURE — 99213 OFFICE O/P EST LOW 20 MIN: CPT | Performed by: STUDENT IN AN ORGANIZED HEALTH CARE EDUCATION/TRAINING PROGRAM

## 2024-03-12 PROCEDURE — 97597 DBRDMT OPN WND 1ST 20 CM/<: CPT | Performed by: STUDENT IN AN ORGANIZED HEALTH CARE EDUCATION/TRAINING PROGRAM

## 2024-03-12 RX ORDER — LIDOCAINE HYDROCHLORIDE 40 MG/ML
5 SOLUTION TOPICAL ONCE
Status: COMPLETED | OUTPATIENT
Start: 2024-03-12 | End: 2024-03-12

## 2024-03-12 RX ORDER — CARVEDILOL 25 MG/1
25 TABLET ORAL 2 TIMES DAILY WITH MEALS
COMMUNITY
Start: 2024-02-22

## 2024-03-12 RX ADMIN — LIDOCAINE HYDROCHLORIDE 5 ML: 40 SOLUTION TOPICAL at 14:45

## 2024-03-12 NOTE — PROGRESS NOTES
"Patient ID: Mejia Baez is a 73 y.o. male Date of Birth 1950     Chief Complaint  No chief complaint on file.      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, limited to breakdown of skin (HCC)  -     Debridement    Chronic venous hypertension (idiopathic) with ulcer of right lower extremity (CODE) ([facility])    Lipedema    Lymphedema of both lower extremities    Acute on chronic heart failure, unspecified heart failure type ([facility])    Chronic venous hypertension (idiopathic) with ulcer of left lower extremity (CODE) ([facility])    Other orders  -     carvedilol (COREG) 25 mg tablet; Take 25 mg by mouth 2 (two) times a day with meals  -     Empagliflozin (Jardiance) 10 MG TABS tablet; every 24 hours              Debridement   Wound 03/12/24 Venous Ulcer Pretibial Distal;Right;Lateral    Universal Protocol:  Consent: Verbal consent obtained.  Risks and benefits: risks, benefits and alternatives were discussed  Consent given by: patient  Time out: Immediately prior to procedure a \"time out\" was called to verify the correct patient, procedure, equipment, support staff and site/side marked as required.  Patient identity confirmed: verbally with patient    Debridement Details  Performed by: physician  Debridement type: selective  Pain control: lidocaine 4%      Post-debridement measurements  Length (cm): 1.5  Width (cm): 1  Depth (cm): 0.1  Percent debrided: 90%  Surface Area (cm^2): 1.5  Area Debrided (cm^2): 1.35  Volume (cm^3): 0.15    Devitalized tissue debrided: biofilm and exudate  Instrument(s) utilized: curette  Bleeding: small  Hemostasis " "obtained with: pressure  Procedural pain (0-10): 1  Post-procedural pain: 0   Response to treatment: procedure was tolerated well    Debridement   Wound 03/12/24 Venous Ulcer Pretibial Left;Lateral    Universal Protocol:  Consent: Verbal consent obtained.  Risks and benefits: risks, benefits and alternatives were discussed  Consent given by: patient  Time out: Immediately prior to procedure a \"time out\" was called to verify the correct patient, procedure, equipment, support staff and site/side marked as required.  Patient identity confirmed: verbally with patient    Debridement Details  Performed by: physician  Debridement type: selective  Pain control: lidocaine 4%      Post-debridement measurements  Length (cm): 1.8  Width (cm): 0.8  Depth (cm): 0.1  Percent debrided: 90%  Surface Area (cm^2): 1.44  Area Debrided (cm^2): 1.3  Volume (cm^3): 0.14    Devitalized tissue debrided: biofilm and exudate  Instrument(s) utilized: curette  Bleeding: small  Hemostasis obtained with: pressure  Procedural pain (0-10): 1  Post-procedural pain: 0   Response to treatment: procedure was tolerated well        Plan:  It was a pleasure to see Mejia Baez for wound care visit today.  Notably patient was discharged several weeks ago but noticed no open wounds of bilateral lower extremities.    Selective debridement performed today as above  Start plan of care as noted below with Enrike dee.  Will hold off on higher level compression such as Coflex has been used in the past on Mejia due to his relative shortness of breath.  Given patient's symptoms as well as physical exam I am concerned for acute on chronic heart failure mixed possible respiratory viral infection.  Patient also notes that he has been urinating less and believes legs are larger.  Does not weigh himself daily.  Several weeks ago his diuretic was changed from furosemide to hydrochlorothiazide as he disliked the frequent urination with furosemide.   No signs or " symptoms of infection today. Patient understands that if any signs of infection start (such as increased redness, drainage, pain, fever, chills, diaphoresis), they should call our office or proceed to the ER or Urgent Care.  Patient should continue a high protein diet to facilitate wound healing  Patient is advised to not submerge wound or leave wound open to air.  Follow up in 1 weeks  Given the multi-factorial nature of wound care, additional time was taken to review patient's treatment plan with other specialties and most recent pertinent lab work and imaging.   All plans of care discussed with patient at bedside who verbalized understanding with treatment plan.       Wound 03/12/24 Venous Ulcer Pretibial Distal;Right;Lateral (Active)   Wound Image Images linked 03/12/24 1443   Wound Description Yellow;Pink;Brown;White 03/12/24 1443   Jessica-wound Assessment Scaly;Edema;Pink 03/12/24 1443   Wound Length (cm) 1.5 cm 03/12/24 1443   Wound Width (cm) 1 cm 03/12/24 1443   Wound Depth (cm) 0.1 cm 03/12/24 1443   Wound Surface Area (cm^2) 1.5 cm^2 03/12/24 1443   Wound Volume (cm^3) 0.15 cm^3 03/12/24 1443   Calculated Wound Volume (cm^3) 0.15 cm^3 03/12/24 1443   Drainage Amount Moderate 03/12/24 1443   Drainage Description Serosanguineous 03/12/24 1443   Non-staged Wound Description Full thickness 03/12/24 1443   Dressing Status Other (Comment) (Open to air) 03/12/24 1443       Wound 03/12/24 Venous Ulcer Pretibial Left;Lateral (Active)   Wound Image Images linked 03/12/24 1442   Wound Description Yellow;Pink;Granulation tissue 03/12/24 1442   Jessica-wound Assessment Dry;Scaly;Edema;Pink 03/12/24 1442   Wound Length (cm) 1.8 cm 03/12/24 1442   Wound Width (cm) 0.8 cm 03/12/24 1442   Wound Depth (cm) 0.1 cm 03/12/24 1442   Wound Surface Area (cm^2) 1.44 cm^2 03/12/24 1442   Wound Volume (cm^3) 0.144 cm^3 03/12/24 1442   Calculated Wound Volume (cm^3) 0.14 cm^3 03/12/24 1442   Drainage Amount Small 03/12/24 1442   Drainage  Description Serous 03/12/24 1442   Non-staged Wound Description Full thickness 03/12/24 1442   Dressing Status Other (Comment) (Open to air) 03/12/24 1442       Wound 03/12/24 Venous Ulcer Pretibial Distal;Right;Lateral (Active)   Date First Assessed/Time First Assessed: 03/12/24 1439   Primary Wound Type: Venous Ulcer  Location: Pretibial  Wound Location Orientation: Distal;Right;Lateral       Wound 03/12/24 Venous Ulcer Pretibial Left;Lateral (Active)   Date First Assessed/Time First Assessed: 03/12/24 1440   Primary Wound Type: Venous Ulcer  Location: Pretibial  Wound Location Orientation: Left;Lateral       Subjective:      .    3/12/24: Patient here for new visit as he notes that he has had 2 new wound openings on bilateral lower extremities.  Patient was discharged from wound management approximately 3 and half weeks ago.  Since then noticed that he has been having increase in the girth of his lower extremities and wounds opened several days ago.  Notes that his diuretic was changed from Lasix to hydrochlorothiazide at his request as he did not like to urinate frequently.  He is urinating less now with new diuretic.  He does not weigh himself daily.  He has been short of breath the past couple days which is worsening.  This also occurs at rest.  Denies any fever chills diaphoresis.        The following portions of the patient's history were reviewed and updated as appropriate: allergies, current medications, past family history, past medical history, past social history, past surgical history, and problem list.    Review of Systems   Constitutional:  Negative for chills, diaphoresis and fever.   Skin:  Positive for wound.   All other systems reviewed and are negative.        Objective:       Wound 03/12/24 Venous Ulcer Pretibial Distal;Right;Lateral (Active)   Wound Image Images linked 03/12/24 1443   Wound Description Yellow;Pink;Brown;White 03/12/24 1443   Jessica-wound Assessment Scaly;Edema;Pink 03/12/24 1443    Wound Length (cm) 1.5 cm 03/12/24 1443   Wound Width (cm) 1 cm 03/12/24 1443   Wound Depth (cm) 0.1 cm 03/12/24 1443   Wound Surface Area (cm^2) 1.5 cm^2 03/12/24 1443   Wound Volume (cm^3) 0.15 cm^3 03/12/24 1443   Calculated Wound Volume (cm^3) 0.15 cm^3 03/12/24 1443   Drainage Amount Moderate 03/12/24 1443   Drainage Description Serosanguineous 03/12/24 1443   Non-staged Wound Description Full thickness 03/12/24 1443   Dressing Status Other (Comment) (Open to air) 03/12/24 1443       Wound 03/12/24 Venous Ulcer Pretibial Left;Lateral (Active)   Wound Image Images linked 03/12/24 1442   Wound Description Yellow;Pink;Granulation tissue 03/12/24 1442   Jessica-wound Assessment Dry;Scaly;Edema;Pink 03/12/24 1442   Wound Length (cm) 1.8 cm 03/12/24 1442   Wound Width (cm) 0.8 cm 03/12/24 1442   Wound Depth (cm) 0.1 cm 03/12/24 1442   Wound Surface Area (cm^2) 1.44 cm^2 03/12/24 1442   Wound Volume (cm^3) 0.144 cm^3 03/12/24 1442   Calculated Wound Volume (cm^3) 0.14 cm^3 03/12/24 1442   Drainage Amount Small 03/12/24 1442   Drainage Description Serous 03/12/24 1442   Non-staged Wound Description Full thickness 03/12/24 1442   Dressing Status Other (Comment) (Open to air) 03/12/24 1442       BP (!) 179/90   Pulse 79   Temp (!) 97.4 °F (36.3 °C)   Resp 20     Physical Exam  Vitals reviewed.   Constitutional:       General: He is not in acute distress.     Appearance: He is obese. He is ill-appearing. He is not toxic-appearing or diaphoretic.      Comments: Conversational dyspnea   HENT:      Head: Normocephalic and atraumatic.   Eyes:      Extraocular Movements: Extraocular movements intact.   Pulmonary:      Breath sounds: Wheezing present.      Comments: Crackles at right base    Use of accessory muscle for respiration    Musculoskeletal:      Cervical back: Neck supple.      Right lower leg: Edema (+2) present.      Left lower leg: Edema (+2) present.   Skin:     Comments: 2 discrete openings of distal anterior  right lower extremity and lateral left lower extremity.  Healthy wound bed without any sign of infection.   Neurological:      Mental Status: He is alert.   Psychiatric:         Mood and Affect: Mood normal.                 Wound Instructions:  Orders Placed This Encounter   Procedures    Wound cleansing and dressings     Bilateral lower extremity wounds     Wash your hands with soap and water.  Remove old dressing, discard into plastic bag and place in trash.  Cleanse the wound with mild soap and water prior to applying a clean dressing. Do not use tissue or cotton balls. Do not scrub the wound. Pat dry using gauze.  Shower yes, remove dressing to shower, wash wound with mild soap and water. Redress immediately    Apply hydrafera ready to the open wound.  Cover with gauze  Secure with rolled gauze and tape  Change dressing 3 times weekly    The above was completed today at the wound center.     Standing Status:   Future     Standing Expiration Date:   3/12/2025    Wound compression and edema control     Elastic Tubular Stocking: Spanda- Size F    Tubular elastic bandage: Apply from base of toes to behind the knee. Apply in AM, may remove for sleep.    Avoid prolonged standing in one place.    Elevate leg(s) above the level of the heart when sitting or as much as possible.     Standing Status:   Future     Standing Expiration Date:   3/12/2025    Wound miscellaneous orders     It is recommended you be seen in the emergency room today for evaluation.     Standing Status:   Future     Standing Expiration Date:   3/12/2025    Debridement     This order was created via procedure documentation    Debridement     This order was created via procedure documentation        Diagnosis ICD-10-CM Associated Orders   1. Non-pressure ulcer of lower extremity with fat layer exposed, right ([facility])  L97.912 lidocaine (XYLOCAINE) 4 % topical solution 5 mL     Wound cleansing and dressings     Wound compression and edema control      "Wound miscellaneous orders     Debridement      2. Non-pressure chronic ulcer of left lower leg, limited to breakdown of skin ([facility])  L97.921 Debridement      3. Chronic venous hypertension (idiopathic) with ulcer of right lower extremity (CODE) ([facility])  I87.311       4. Lipedema  R60.9       5. Lymphedema of both lower extremities  I89.0       6. Acute on chronic heart failure, unspecified heart failure type ([facility])  I50.9       7. Chronic venous hypertension (idiopathic) with ulcer of left lower extremity (CODE) ([facility])  I87.312         --  Katlyn Rossi MD    \"This note has been constructed using a voice recognition system. Therefore there may be syntax, spelling, and/or grammatical errors. Occasional wrong word or \"sound alike\" substitutions may have occurred due to the inherent limitations of voice recognition software. Read the chart carefully and recognize, using context, where substitutions have occurred. Please call if you have any questions.\"       "

## 2024-03-12 NOTE — PATIENT INSTRUCTIONS
Orders Placed This Encounter   Procedures    Wound cleansing and dressings     Bilateral lower extremity wounds     Wash your hands with soap and water.  Remove old dressing, discard into plastic bag and place in trash.  Cleanse the wound with mild soap and water prior to applying a clean dressing. Do not use tissue or cotton balls. Do not scrub the wound. Pat dry using gauze.  Shower yes, remove dressing to shower, wash wound with mild soap and water. Redress immediately    Apply hydrafera ready to the open wound.  Cover with gauze  Secure with rolled gauze and tape  Change dressing 3 times weekly    The above was completed today at the wound center.     Standing Status:   Future     Standing Expiration Date:   3/12/2025    Wound compression and edema control     Elastic Tubular Stocking: Spanda- Size F    Tubular elastic bandage: Apply from base of toes to behind the knee. Apply in AM, may remove for sleep.    Avoid prolonged standing in one place.    Elevate leg(s) above the level of the heart when sitting or as much as possible.     Standing Status:   Future     Standing Expiration Date:   3/12/2025    Wound miscellaneous orders     It is recommended you be seen in the emergency room today for evaluation.     Standing Status:   Future     Standing Expiration Date:   3/12/2025

## 2024-03-19 ENCOUNTER — OFFICE VISIT (OUTPATIENT)
Dept: WOUND CARE | Facility: HOSPITAL | Age: 74
End: 2024-03-19
Payer: COMMERCIAL

## 2024-03-19 VITALS
RESPIRATION RATE: 20 BRPM | SYSTOLIC BLOOD PRESSURE: 182 MMHG | HEART RATE: 80 BPM | DIASTOLIC BLOOD PRESSURE: 99 MMHG | TEMPERATURE: 97.5 F

## 2024-03-19 DIAGNOSIS — I87.312 CHRONIC VENOUS HYPERTENSION (IDIOPATHIC) WITH ULCER OF LEFT LOWER EXTREMITY (CODE) (HCC): ICD-10-CM

## 2024-03-19 DIAGNOSIS — L97.912 NON-PRESSURE ULCER OF LOWER EXTREMITY WITH FAT LAYER EXPOSED, RIGHT (HCC): ICD-10-CM

## 2024-03-19 DIAGNOSIS — I87.311 CHRONIC VENOUS HYPERTENSION (IDIOPATHIC) WITH ULCER OF RIGHT LOWER EXTREMITY (CODE) (HCC): ICD-10-CM

## 2024-03-19 DIAGNOSIS — L97.921 NON-PRESSURE CHRONIC ULCER OF LEFT LOWER LEG, LIMITED TO BREAKDOWN OF SKIN (HCC): Primary | ICD-10-CM

## 2024-03-19 DIAGNOSIS — R60.9 LIPEDEMA: ICD-10-CM

## 2024-03-19 DIAGNOSIS — I89.0 LYMPHEDEMA OF BOTH LOWER EXTREMITIES: ICD-10-CM

## 2024-03-19 PROCEDURE — 97597 DBRDMT OPN WND 1ST 20 CM/<: CPT | Performed by: STUDENT IN AN ORGANIZED HEALTH CARE EDUCATION/TRAINING PROGRAM

## 2024-03-19 RX ORDER — LIDOCAINE HYDROCHLORIDE 40 MG/ML
5 SOLUTION TOPICAL ONCE
Status: COMPLETED | OUTPATIENT
Start: 2024-03-19 | End: 2024-03-19

## 2024-03-19 RX ADMIN — LIDOCAINE HYDROCHLORIDE 5 ML: 40 SOLUTION TOPICAL at 14:42

## 2024-03-19 NOTE — PATIENT INSTRUCTIONS
Orders Placed This Encounter   Procedures    Wound cleansing and dressings     Bilateral lower extremity wounds              Wash your hands with soap and water.  Remove old dressing, discard into plastic bag and place in trash.  Cleanse the wound with mild soap and water prior to applying a clean dressing. Do not use tissue or cotton balls. Do not scrub the wound. Pat dry using gauze.  Shower yes, remove dressing to shower, wash wound with mild soap and water. Redress immediately     Apply Hydrocolloid ready to the open wound.      Change dressing weekly at Wound Center.     The above was completed today at the wound center.     Standing Status:   Future     Standing Expiration Date:   3/19/2025    Wound compression and edema control     Elastic Tubular Stocking: Spanda- Size F     Tubular elastic bandage: Apply from base of toes to behind the knee. Apply in AM, may remove for sleep.     Avoid prolonged standing in one place.     Elevate leg(s) above the level of the heart when sitting or as much as possible.     Standing Status:   Future     Standing Expiration Date:   3/19/2025    Wound miscellaneous orders     Protein: Eat protein with each meal to promote healing.  Examples of protein are fish, meat, chicken, nuts, peanut butter, eggs, lentils, edamame or a protein shake.    Wound infection:  If you have signs of infection please call the wound center.  If the wound center is closed- please go to the Emergency department.  Some signs of infection:  fever, chills, increased redness, red streaks, increase in pain, increased drainage.  Drainage with an odor, Change in drainage color: white/milky/green/tan/yellow,  an increase in swelling, chest pain and/or shortness of breath.     Recommend you purchase a scale and weight yourself every day.  If you gain 5 lbs or more you should call your cardiologist.     Standing Status:   Future     Standing Expiration Date:   3/19/2025

## 2024-03-19 NOTE — PROGRESS NOTES
Patient ID: Mejia Baez is a 73 y.o. male Date of Birth 1950     Chief Complaint  Chief Complaint   Patient presents with    Follow Up Wound Care Visit     RLE and LLE       Allergies  Amoxicillin    Assessment:     Diagnoses and all orders for this visit:    Non-pressure chronic ulcer of left lower leg, limited to breakdown of skin (HCC)  -     lidocaine (XYLOCAINE) 4 % topical solution 5 mL  -     Wound cleansing and dressings; Future  -     Wound compression and edema control; Future  -     Wound miscellaneous orders; Future  -     Debridement    Non-pressure ulcer of lower extremity with fat layer exposed, right (HCC)  -     lidocaine (XYLOCAINE) 4 % topical solution 5 mL  -     Wound cleansing and dressings; Future  -     Wound compression and edema control; Future  -     Wound miscellaneous orders; Future  -     Debridement    Chronic venous hypertension (idiopathic) with ulcer of left lower extremity (CODE) ([facility])  -     lidocaine (XYLOCAINE) 4 % topical solution 5 mL  -     Wound cleansing and dressings; Future  -     Wound compression and edema control; Future  -     Wound miscellaneous orders; Future    Chronic venous hypertension (idiopathic) with ulcer of right lower extremity (CODE) ([facility])  -     lidocaine (XYLOCAINE) 4 % topical solution 5 mL  -     Wound cleansing and dressings; Future  -     Wound compression and edema control; Future  -     Wound miscellaneous orders; Future    Lipedema  -     lidocaine (XYLOCAINE) 4 % topical solution 5 mL  -     Wound cleansing and dressings; Future  -     Wound compression and edema control; Future  -     Wound miscellaneous orders; Future    Lymphedema of both lower extremities  -     lidocaine (XYLOCAINE) 4 % topical solution 5 mL  -     Wound cleansing and dressings; Future  -     Wound compression and edema control; Future  -     Wound miscellaneous orders; Future              Debridement   Wound 03/12/24 Venous Ulcer Pretibial Left;Lateral   "  Universal Protocol:  Consent: Verbal consent obtained.  Risks and benefits: risks, benefits and alternatives were discussed  Consent given by: patient  Time out: Immediately prior to procedure a \"time out\" was called to verify the correct patient, procedure, equipment, support staff and site/side marked as required.  Patient identity confirmed: verbally with patient    Debridement Details  Performed by: physician  Debridement type: selective  Pain control: lidocaine 4%      Post-debridement measurements  Length (cm): 1.5  Width (cm): 0.5  Depth (cm): 0.1  Percent debrided: 90%  Surface Area (cm^2): 0.75  Area Debrided (cm^2): 0.68  Volume (cm^3): 0.08    Devitalized tissue debrided: biofilm and exudate  Instrument(s) utilized: curette  Bleeding: small  Hemostasis obtained with: pressure  Procedural pain (0-10): 1  Post-procedural pain: 0   Response to treatment: procedure was tolerated well    Debridement   Wound 03/12/24 Venous Ulcer Pretibial Distal;Right;Lateral    Universal Protocol:  Consent: Verbal consent obtained.  Risks and benefits: risks, benefits and alternatives were discussed  Consent given by: patient  Time out: Immediately prior to procedure a \"time out\" was called to verify the correct patient, procedure, equipment, support staff and site/side marked as required.  Patient identity confirmed: verbally with patient    Debridement Details  Performed by: physician  Debridement type: selective  Pain control: lidocaine 4%      Post-debridement measurements  Length (cm): 1.5  Width (cm): 0.5  Depth (cm): 0.1  Percent debrided: 90%  Surface Area (cm^2): 0.75  Area Debrided (cm^2): 0.68  Volume (cm^3): 0.08    Devitalized tissue debrided: biofilm and exudate  Instrument(s) utilized: curette  Bleeding: small  Hemostasis obtained with: pressure  Procedural pain (0-10): 1  Post-procedural pain: 0   Response to treatment: procedure was tolerated well        Plan:   It was a pleasure to see Mejia Baez for " wound care follow up today  Selective debridement performed today as above  Wound is improving   Continue plan of care as noted below with hydrocolloid this may be left on during the week.  Patient notes that he is not able to do wound dressing changes himself and does not have anyone to help him.  Does not qualify for VNA.  No signs or symptoms of infection today. Patient understands that if any signs of infection start (such as increased redness, drainage, pain, fever, chills, diaphoresis), they should call our office or proceed to the ER or Urgent Care.  Patient should continue a high protein diet to facilitate wound healing  Patient is advised to not submerge wound or leave wound open to air.  Follow up in 1 weeks  Given the multi-factorial nature of wound care, additional time was taken to review patient's treatment plan with other specialties and most recent pertinent lab work and imaging.   All plans of care discussed with patient at bedside who verbalized understanding with treatment plan.    Wound 03/12/24 Venous Ulcer Pretibial Distal;Right;Lateral (Active)   Wound Image Images linked 03/19/24 1438   Wound Description Pink 03/19/24 1438   Jessica-wound Assessment Dry;Edema;Pink;Scaly 03/19/24 1438   Wound Length (cm) 0.7 cm 03/19/24 1438   Wound Width (cm) 0.7 cm 03/19/24 1438   Wound Depth (cm) 0.1 cm 03/19/24 1438   Wound Surface Area (cm^2) 0.49 cm^2 03/19/24 1438   Wound Volume (cm^3) 0.049 cm^3 03/19/24 1438   Calculated Wound Volume (cm^3) 0.05 cm^3 03/19/24 1438   Change in Wound Size % 66.67 03/19/24 1438   Drainage Amount Small 03/19/24 1438   Drainage Description Serosanguineous 03/19/24 1438   Non-staged Wound Description Full thickness 03/19/24 1438   Dressing Status Intact (upon arrival) 03/19/24 1438       Wound 03/12/24 Venous Ulcer Pretibial Left;Lateral (Active)   Wound Image Images linked 03/19/24 1438   Wound Description Beefy red;Yellow 03/19/24 1438   Jessica-wound Assessment Dry;Edema;Scaly  03/19/24 1438   Wound Length (cm) 1.5 cm 03/19/24 1438   Wound Width (cm) 0.5 cm 03/19/24 1438   Wound Depth (cm) 0.1 cm 03/19/24 1438   Wound Surface Area (cm^2) 0.75 cm^2 03/19/24 1438   Wound Volume (cm^3) 0.075 cm^3 03/19/24 1438   Calculated Wound Volume (cm^3) 0.08 cm^3 03/19/24 1438   Change in Wound Size % 42.86 03/19/24 1438   Drainage Amount Small 03/19/24 1438   Drainage Description Serosanguineous 03/19/24 1438   Dressing Status Intact (upon arrival) 03/19/24 1438       Wound 03/12/24 Venous Ulcer Pretibial Distal;Right;Lateral (Active)   Date First Assessed/Time First Assessed: 03/12/24 1439   Primary Wound Type: Venous Ulcer  Location: Pretibial  Wound Location Orientation: Distal;Right;Lateral       Wound 03/12/24 Venous Ulcer Pretibial Left;Lateral (Active)   Date First Assessed/Time First Assessed: 03/12/24 1440   Primary Wound Type: Venous Ulcer  Location: Pretibial  Wound Location Orientation: Left;Lateral       Subjective:      .    3/19/24: After last visit patient did go to ER as recommended.  Was noted to be positive for COVID-19 at that time.  Also advised to see cardiology regarding change to the medication due to increased edema, shortness of breath, elevated blood pressure.  Patient had telehealth visit with cardiology who switched hydrochlorothiazide back to furosemide and also added losartan.  Patient states that he still slightly short of breath but overall much improved since last visit.  No symptoms of infection including fever chills diaphoresis today.  Patient does note that the same dressing has been left in place since her last visit.  He is having difficulty with reaching far down his legs to put on wound dressing.  Does not have any help at home and does not qualify for VNA    3/12/24: Patient here for new visit as he notes that he has had 2 new wound openings on bilateral lower extremities.  Patient was discharged from wound management approximately 3 and half weeks ago.  Since  then noticed that he has been having increase in the girth of his lower extremities and wounds opened several days ago.  Notes that his diuretic was changed from Lasix to hydrochlorothiazide at his request as he did not like to urinate frequently.  He is urinating less now with new diuretic.  He does not weigh himself daily.  He has been short of breath the past couple days which is worsening.  This also occurs at rest.  Denies any fever chills diaphoresis.        The following portions of the patient's history were reviewed and updated as appropriate: allergies, current medications, past family history, past medical history, past social history, past surgical history, and problem list.    Review of Systems   Constitutional:  Negative for chills, diaphoresis and fever.   Respiratory:  Positive for shortness of breath. Negative for chest tightness and wheezing.    Cardiovascular:  Negative for chest pain.   Skin:  Positive for wound.   All other systems reviewed and are negative.        Objective:       Wound 03/12/24 Venous Ulcer Pretibial Distal;Right;Lateral (Active)   Wound Image Images linked 03/19/24 1438   Wound Description Pink 03/19/24 1438   Jessica-wound Assessment Dry;Edema;Pink;Scaly 03/19/24 1438   Wound Length (cm) 0.7 cm 03/19/24 1438   Wound Width (cm) 0.7 cm 03/19/24 1438   Wound Depth (cm) 0.1 cm 03/19/24 1438   Wound Surface Area (cm^2) 0.49 cm^2 03/19/24 1438   Wound Volume (cm^3) 0.049 cm^3 03/19/24 1438   Calculated Wound Volume (cm^3) 0.05 cm^3 03/19/24 1438   Change in Wound Size % 66.67 03/19/24 1438   Drainage Amount Small 03/19/24 1438   Drainage Description Serosanguineous 03/19/24 1438   Non-staged Wound Description Full thickness 03/19/24 1438   Dressing Status Intact (upon arrival) 03/19/24 1438       Wound 03/12/24 Venous Ulcer Pretibial Left;Lateral (Active)   Wound Image Images linked 03/19/24 1438   Wound Description Beefy red;Yellow 03/19/24 1438   Jessica-wound Assessment  Dry;Edema;Scaly 03/19/24 1438   Wound Length (cm) 1.5 cm 03/19/24 1438   Wound Width (cm) 0.5 cm 03/19/24 1438   Wound Depth (cm) 0.1 cm 03/19/24 1438   Wound Surface Area (cm^2) 0.75 cm^2 03/19/24 1438   Wound Volume (cm^3) 0.075 cm^3 03/19/24 1438   Calculated Wound Volume (cm^3) 0.08 cm^3 03/19/24 1438   Change in Wound Size % 42.86 03/19/24 1438   Drainage Amount Small 03/19/24 1438   Drainage Description Serosanguineous 03/19/24 1438   Dressing Status Intact (upon arrival) 03/19/24 1438       BP (!) 182/99   Pulse 80   Temp 97.5 °F (36.4 °C)   Resp 20     Physical Exam  Vitals reviewed.   Constitutional:       Appearance: Normal appearance.   HENT:      Head: Normocephalic and atraumatic.   Eyes:      Extraocular Movements: Extraocular movements intact.   Pulmonary:      Effort: Pulmonary effort is normal.   Musculoskeletal:      Cervical back: Neck supple.      Right lower leg: Edema (+3) present.      Left lower leg: Edema (+3) present.   Skin:     Comments: Lower extremity wounds both slightly improved and smaller than last exam.  Small amount of serosanguineous exudate.  No signs of infection.   Neurological:      Mental Status: He is alert.   Psychiatric:         Mood and Affect: Mood normal.                 Wound Instructions:  Orders Placed This Encounter   Procedures    Wound cleansing and dressings     Bilateral lower extremity wounds              Wash your hands with soap and water.  Remove old dressing, discard into plastic bag and place in trash.  Cleanse the wound with mild soap and water prior to applying a clean dressing. Do not use tissue or cotton balls. Do not scrub the wound. Pat dry using gauze.  Shower yes, remove dressing to shower, wash wound with mild soap and water. Redress immediately     Apply Hydrocolloid ready to the open wound.      Change dressing weekly at Wound Center.     The above was completed today at the wound center.     Standing Status:   Future     Standing Expiration  Date:   3/19/2025    Wound compression and edema control     Elastic Tubular Stocking: Spanda- Size F     Tubular elastic bandage: Apply from base of toes to behind the knee. Apply in AM, may remove for sleep.     Avoid prolonged standing in one place.     Elevate leg(s) above the level of the heart when sitting or as much as possible.     Standing Status:   Future     Standing Expiration Date:   3/19/2025    Wound miscellaneous orders     Protein: Eat protein with each meal to promote healing.  Examples of protein are fish, meat, chicken, nuts, peanut butter, eggs, lentils, edamame or a protein shake.    Wound infection:  If you have signs of infection please call the wound center.  If the wound center is closed- please go to the Emergency department.  Some signs of infection:  fever, chills, increased redness, red streaks, increase in pain, increased drainage.  Drainage with an odor, Change in drainage color: white/milky/green/tan/yellow,  an increase in swelling, chest pain and/or shortness of breath.     Recommend you purchase a scale and weight yourself every day.  If you gain 5 lbs or more you should call your cardiologist.     Standing Status:   Future     Standing Expiration Date:   3/19/2025    Debridement     This order was created via procedure documentation    Debridement     This order was created via procedure documentation        Diagnosis ICD-10-CM Associated Orders   1. Non-pressure chronic ulcer of left lower leg, limited to breakdown of skin ([facility])  L97.921 lidocaine (XYLOCAINE) 4 % topical solution 5 mL     Wound cleansing and dressings     Wound compression and edema control     Wound miscellaneous orders     Debridement      2. Non-pressure ulcer of lower extremity with fat layer exposed, right ([facility])  L97.912 lidocaine (XYLOCAINE) 4 % topical solution 5 mL     Wound cleansing and dressings     Wound compression and edema control     Wound miscellaneous orders     Debridement      3. Chronic  "venous hypertension (idiopathic) with ulcer of left lower extremity (CODE) ([facility])  I87.312 lidocaine (XYLOCAINE) 4 % topical solution 5 mL     Wound cleansing and dressings     Wound compression and edema control     Wound miscellaneous orders      4. Chronic venous hypertension (idiopathic) with ulcer of right lower extremity (CODE) ([facility])  I87.311 lidocaine (XYLOCAINE) 4 % topical solution 5 mL     Wound cleansing and dressings     Wound compression and edema control     Wound miscellaneous orders      5. Lipedema  R60.9 lidocaine (XYLOCAINE) 4 % topical solution 5 mL     Wound cleansing and dressings     Wound compression and edema control     Wound miscellaneous orders      6. Lymphedema of both lower extremities  I89.0 lidocaine (XYLOCAINE) 4 % topical solution 5 mL     Wound cleansing and dressings     Wound compression and edema control     Wound miscellaneous orders          --  Katlyn Rossi MD    \"This note has been constructed using a voice recognition system. Therefore there may be syntax, spelling, and/or grammatical errors. Occasional wrong word or \"sound alike\" substitutions may have occurred due to the inherent limitations of voice recognition software. Read the chart carefully and recognize, using context, where substitutions have occurred. Please call if you have any questions.\"     "

## 2024-03-22 ENCOUNTER — TELEPHONE (OUTPATIENT)
Dept: OTHER | Facility: OTHER | Age: 74
End: 2024-03-22

## 2024-03-22 NOTE — TELEPHONE ENCOUNTER
Patient calling wondering if our Vascular center takes [employer]? His cardiologist in [place], [place] -Dr. Thompson recommended that he sees a [facility]ascular [facility] for his ankles. He also goes [facility] for his ankles. Please call him back and verify ,if you take it then he would like to set up a new patient appointment.

## 2024-03-26 ENCOUNTER — OFFICE VISIT (OUTPATIENT)
Dept: WOUND CARE | Facility: HOSPITAL | Age: 74
End: 2024-03-26
Payer: COMMERCIAL

## 2024-03-26 DIAGNOSIS — I87.312 CHRONIC VENOUS HYPERTENSION (IDIOPATHIC) WITH ULCER OF LEFT LOWER EXTREMITY (CODE) (HCC): ICD-10-CM

## 2024-03-26 DIAGNOSIS — L97.912 NON-PRESSURE ULCER OF LOWER EXTREMITY WITH FAT LAYER EXPOSED, RIGHT (HCC): ICD-10-CM

## 2024-03-26 DIAGNOSIS — L97.921 NON-PRESSURE CHRONIC ULCER OF LEFT LOWER LEG, LIMITED TO BREAKDOWN OF SKIN (HCC): Primary | ICD-10-CM

## 2024-03-26 DIAGNOSIS — I87.311 CHRONIC VENOUS HYPERTENSION (IDIOPATHIC) WITH ULCER OF RIGHT LOWER EXTREMITY (CODE) (HCC): ICD-10-CM

## 2024-03-26 PROCEDURE — 99212 OFFICE O/P EST SF 10 MIN: CPT | Performed by: STUDENT IN AN ORGANIZED HEALTH CARE EDUCATION/TRAINING PROGRAM

## 2024-03-26 RX ORDER — LOSARTAN POTASSIUM 25 MG/1
25 TABLET ORAL 2 TIMES DAILY
COMMUNITY

## 2024-03-26 RX ORDER — VIT C/B6/B5/MAGNESIUM/HERB 173 50-5-6-5MG
500 CAPSULE ORAL DAILY
COMMUNITY

## 2024-03-26 RX ORDER — ASPIRIN 81 MG/1
81 TABLET, CHEWABLE ORAL DAILY
COMMUNITY

## 2024-03-26 RX ORDER — PRIMIDONE 250 MG/1
500 TABLET ORAL 2 TIMES DAILY
COMMUNITY

## 2024-03-26 NOTE — PATIENT INSTRUCTIONS
Orders Placed This Encounter   Procedures    Wound cleansing and dressings     Moisturize both lower extremities and wear compression daily     Standing Status:   Future     Standing Expiration Date:   3/26/2025

## 2024-03-26 NOTE — PROGRESS NOTES
Patient ID: Mejia Baez is a 73 y.o. male Date of Birth 1950     Chief Complaint  Chief Complaint   Patient presents with    Follow Up Wound Care Visit       Allergies  Amoxicillin    Assessment:     Diagnoses and all orders for this visit:    Non-pressure chronic ulcer of left lower leg, limited to breakdown of skin (HCC)  -     Wound cleansing and dressings; Future    Non-pressure ulcer of lower extremity with fat layer exposed, right (HCC)  -     Wound cleansing and dressings; Future    Chronic venous hypertension (idiopathic) with ulcer of left lower extremity (CODE) (HCC)    Chronic venous hypertension (idiopathic) with ulcer of right lower extremity (CODE) (HCC)    Other orders  -     losartan (COZAAR) 25 mg tablet; Take 25 mg by mouth 2 (two) times a day  -     aspirin 81 mg chewable tablet; Chew 81 mg daily  -     Turmeric 500 MG CAPS; Take 500 mg by mouth daily  -     primidone (MYSOLINE) 250 mg tablet; Take 500 mg by mouth 2 (two) times a day                Plan:  It was a pleasure to see Mejia Baez for wound care follow up today  Wounds are fully epithelialized today.  Patient advised that to protect newly healed skin with moisturization and edema control using compression stockings.  Discharge from wound management today with follow-up as needed in the future  All plans of care discussed with patient at bedside who verbalized understanding with treatment plan.       [REMOVED] Wound 03/12/24 Venous Ulcer Pretibial Distal;Right;Lateral (Removed)   Wound Image Images linked 03/26/24 1417   Wound Description Dry 03/26/24 1417   Jessica-wound Assessment Dry;Edema;Pink;Scaly 03/26/24 1417   Wound Length (cm) 0 cm 03/26/24 1417   Wound Width (cm) 0 cm 03/26/24 1417   Wound Depth (cm) 0 cm 03/26/24 1417   Wound Surface Area (cm^2) 0 cm^2 03/26/24 1417   Wound Volume (cm^3) 0 cm^3 03/26/24 1417   Calculated Wound Volume (cm^3) 0 cm^3 03/26/24 1417   Change in Wound Size % 100 03/26/24 1417   Drainage  Amount None 03/26/24 1417       [REMOVED] Wound 03/12/24 Venous Ulcer Pretibial Left;Lateral (Removed)   Wound Image Images linked 03/26/24 1417   Wound Description Brown;Dry 03/26/24 1417   Jessica-wound Assessment Dry;Edema;Scaly 03/26/24 1417   Wound Length (cm) 0 cm 03/26/24 1417   Wound Width (cm) 0 cm 03/26/24 1417   Wound Depth (cm) 0 cm 03/26/24 1417   Wound Surface Area (cm^2) 0 cm^2 03/26/24 1417   Wound Volume (cm^3) 0 cm^3 03/26/24 1417   Calculated Wound Volume (cm^3) 0 cm^3 03/26/24 1417   Change in Wound Size % 100 03/26/24 1417   Drainage Amount None 03/26/24 1417       [REMOVED] Wound 03/12/24 Venous Ulcer Pretibial Distal;Right;Lateral (Removed)   Resolved Date: 03/26/24  Date First Assessed/Time First Assessed: 03/12/24 1439   Primary Wound Type: Venous Ulcer  Location: Pretibial  Wound Location Orientation: Distal;Right;Lateral  Wound Outcome: Healed       [REMOVED] Wound 03/12/24 Venous Ulcer Pretibial Left;Lateral (Removed)   Resolved Date: 03/26/24  Date First Assessed/Time First Assessed: 03/12/24 1440   Primary Wound Type: Venous Ulcer  Location: Pretibial  Wound Location Orientation: Left;Lateral  Wound Outcome: Healed       Subjective:      .    3/26/24: Hydrocolloid left on wounds since last visit.  No symptoms of infection.    3/19/24: After last visit patient did go to ER as recommended.  Was noted to be positive for COVID-19 at that time.  Also advised to see cardiology regarding change to the medication due to increased edema, shortness of breath, elevated blood pressure.  Patient had telehealth visit with cardiology who switched hydrochlorothiazide back to furosemide and also added losartan.  Patient states that he still slightly short of breath but overall much improved since last visit.  No symptoms of infection including fever chills diaphoresis today.  Patient does note that the same dressing has been left in place since her last visit.  He is having difficulty with reaching far  down his legs to put on wound dressing.  Does not have any help at home and does not qualify for VNA     3/12/24: Patient here for new visit as he notes that he has had 2 new wound openings on bilateral lower extremities.  Patient was discharged from wound management approximately 3 and half weeks ago.  Since then noticed that he has been having increase in the girth of his lower extremities and wounds opened several days ago.  Notes that his diuretic was changed from Lasix to hydrochlorothiazide at his request as he did not like to urinate frequently.  He is urinating less now with new diuretic.  He does not weigh himself daily.  He has been short of breath the past couple days which is worsening.  This also occurs at rest.  Denies any fever chills diaphoresis.             The following portions of the patient's history were reviewed and updated as appropriate: allergies, current medications, past family history, past medical history, past social history, past surgical history, and problem list.    Review of Systems   Constitutional:  Negative for chills, diaphoresis and fever.   Skin:  Negative for wound.   All other systems reviewed and are negative.        Objective:       [REMOVED] Wound 03/12/24 Venous Ulcer Pretibial Distal;Right;Lateral (Removed)   Wound Image Images linked 03/26/24 1417   Wound Description Dry 03/26/24 1417   Jessica-wound Assessment Dry;Edema;Pink;Scaly 03/26/24 1417   Wound Length (cm) 0 cm 03/26/24 1417   Wound Width (cm) 0 cm 03/26/24 1417   Wound Depth (cm) 0 cm 03/26/24 1417   Wound Surface Area (cm^2) 0 cm^2 03/26/24 1417   Wound Volume (cm^3) 0 cm^3 03/26/24 1417   Calculated Wound Volume (cm^3) 0 cm^3 03/26/24 1417   Change in Wound Size % 100 03/26/24 1417   Drainage Amount None 03/26/24 1417       [REMOVED] Wound 03/12/24 Venous Ulcer Pretibial Left;Lateral (Removed)   Wound Image Images linked 03/26/24 1417   Wound Description Brown;Dry 03/26/24 1417   Jessica-wound Assessment  "Dry;Edema;Scaly 03/26/24 1417   Wound Length (cm) 0 cm 03/26/24 1417   Wound Width (cm) 0 cm 03/26/24 1417   Wound Depth (cm) 0 cm 03/26/24 1417   Wound Surface Area (cm^2) 0 cm^2 03/26/24 1417   Wound Volume (cm^3) 0 cm^3 03/26/24 1417   Calculated Wound Volume (cm^3) 0 cm^3 03/26/24 1417   Change in Wound Size % 100 03/26/24 1417   Drainage Amount None 03/26/24 1417       There were no vitals taken for this visit.    Physical Exam  Vitals reviewed.   Constitutional:       Appearance: Normal appearance.   HENT:      Head: Normocephalic and atraumatic.   Eyes:      Extraocular Movements: Extraocular movements intact.   Pulmonary:      Effort: Pulmonary effort is normal.   Musculoskeletal:      Cervical back: Neck supple.      Right lower leg: Edema present.      Left lower leg: Edema present.   Skin:     Comments: Bilateral lower extremity wounds are fully epithelialized today.  No open wound.  No exudate.   Neurological:      Mental Status: He is alert.   Psychiatric:         Mood and Affect: Mood normal.                 Wound Instructions:  Orders Placed This Encounter   Procedures    Wound cleansing and dressings     Moisturize both lower extremities and wear compression daily     Standing Status:   Future     Standing Expiration Date:   3/26/2025        Diagnosis ICD-10-CM Associated Orders   1. Non-pressure chronic ulcer of left lower leg, limited to breakdown of skin ([facility])  L97.921 Wound cleansing and dressings      2. Non-pressure ulcer of lower extremity with fat layer exposed, right ([facility])  L97.912 Wound cleansing and dressings      3. Chronic venous hypertension (idiopathic) with ulcer of left lower extremity (CODE) ([facility])  I87.312       4. Chronic venous hypertension (idiopathic) with ulcer of right lower extremity (CODE) ([facility])  I87.311           --  Katlyn Rossi MD    \"This note has been constructed using a voice recognition system. Therefore there may be syntax, spelling, and/or grammatical errors. " "Occasional wrong word or \"sound alike\" substitutions may have occurred due to the inherent limitations of voice recognition software. Read the chart carefully and recognize, using context, where substitutions have occurred. Please call if you have any questions.\"     "

## 2024-05-17 ENCOUNTER — TELEPHONE (OUTPATIENT)
Age: 74
End: 2024-05-17

## 2024-05-17 ENCOUNTER — APPOINTMENT (OUTPATIENT)
Dept: RADIOLOGY | Facility: CLINIC | Age: 74
End: 2024-05-17
Payer: COMMERCIAL

## 2024-05-17 ENCOUNTER — OFFICE VISIT (OUTPATIENT)
Age: 74
End: 2024-05-17
Payer: COMMERCIAL

## 2024-05-17 VITALS
WEIGHT: 240 LBS | DIASTOLIC BLOOD PRESSURE: 86 MMHG | HEART RATE: 73 BPM | HEIGHT: 69 IN | SYSTOLIC BLOOD PRESSURE: 129 MMHG | BODY MASS INDEX: 35.55 KG/M2

## 2024-05-17 DIAGNOSIS — G89.29 CHRONIC PAIN OF BOTH KNEES: ICD-10-CM

## 2024-05-17 DIAGNOSIS — M25.562 PAIN IN BOTH KNEES, UNSPECIFIED CHRONICITY: ICD-10-CM

## 2024-05-17 DIAGNOSIS — M17.0 PRIMARY OSTEOARTHRITIS OF BOTH KNEES: Primary | ICD-10-CM

## 2024-05-17 DIAGNOSIS — M25.562 CHRONIC PAIN OF BOTH KNEES: ICD-10-CM

## 2024-05-17 DIAGNOSIS — M25.561 CHRONIC PAIN OF BOTH KNEES: ICD-10-CM

## 2024-05-17 DIAGNOSIS — E66.01 OBESITY, MORBID (HCC): ICD-10-CM

## 2024-05-17 DIAGNOSIS — M25.561 PAIN IN BOTH KNEES, UNSPECIFIED CHRONICITY: ICD-10-CM

## 2024-05-17 PROCEDURE — 73564 X-RAY EXAM KNEE 4 OR MORE: CPT

## 2024-05-17 PROCEDURE — 99213 OFFICE O/P EST LOW 20 MIN: CPT | Performed by: ORTHOPAEDIC SURGERY

## 2024-05-17 RX ORDER — SOY PROTEIN
POWDER (GRAM) ORAL
COMMUNITY

## 2024-05-17 RX ORDER — MELOXICAM 15 MG/1
15 TABLET ORAL DAILY
Qty: 30 TABLET | Refills: 0 | Status: SHIPPED | OUTPATIENT
Start: 2024-05-17 | End: 2024-06-16

## 2024-05-17 RX ORDER — ASCORBIC ACID 500 MG/5ML
LIQUID (ML) ORAL
COMMUNITY

## 2024-05-17 RX ORDER — EVENING PRIMROSE OIL 500 MG
100 CAPSULE ORAL DAILY
COMMUNITY

## 2024-05-17 RX ORDER — CHOLESTYRAMINE 4 G/9G
POWDER, FOR SUSPENSION ORAL
COMMUNITY
Start: 2024-05-06

## 2024-05-17 RX ORDER — DOXYCYCLINE HYCLATE 100 MG/1
CAPSULE ORAL
COMMUNITY
Start: 2024-05-15

## 2024-05-17 RX ORDER — PRIMIDONE 50 MG/1
50 TABLET ORAL 2 TIMES DAILY
COMMUNITY
Start: 2024-04-10

## 2024-05-17 RX ORDER — SACUBITRIL AND VALSARTAN 49; 51 MG/1; MG/1
TABLET, FILM COATED ORAL
COMMUNITY
Start: 2024-05-01

## 2024-05-17 NOTE — PROGRESS NOTES
Assessment/Plan:  1. Primary osteoarthritis of both knees  Injection Procedure Prior Authorization      2. Chronic pain of both knees  XR knee 4+ vw left injury    XR knee 4+ vw right injury    Injection Procedure Prior Authorization    meloxicam (Mobic) 15 mg tablet      3. Obesity, morbid (HCC)          Scribe Attestation    I,:  Manisha Santamariatom am acting as a scribe while in the presence of the attending physician.:       I,:  Alex Madden MD personally performed the services described in this documentation    as scribed in my presence.:             Mejia is a pleasant 73 y.o. male who presents for initial evaluation of his bilateral knees. He is symptomatic of his sever underlying osteoarthritis of the knees. Given he has failed physical therapy and corticosteroid injection, I believe he is a candidate for visco supplementation injections of the bilateral knees. I offered him a prescription for meloxicam for his knee pain. Prior authorization was submitted today. He will return to the office for the visco supplementation injections once authorization has been obtained.    Subjective:   Mejia Baez is a 73 y.o. male who presents for initial evaluation of his bilateral knees. His knees have been bothering him for quite some time now.  He states the left knee is more painful than the right. He has tried physical therapy for the knees in the past. He has seen an outside orthopedist in the past and received corticosteroid injections of the knees. He experienced short-lived relief from the injections. He is interested in visco supplementation injections.      Review of Systems   Constitutional:  Positive for activity change. Negative for chills and fever.   HENT:  Negative for ear pain and sore throat.    Eyes:  Negative for pain and visual disturbance.   Respiratory:  Negative for cough and shortness of breath.    Cardiovascular:  Negative for chest pain and palpitations.   Gastrointestinal:  Negative for  abdominal pain and vomiting.   Genitourinary:  Negative for dysuria and hematuria.   Musculoskeletal:  Positive for arthralgias and myalgias. Negative for back pain.   Skin:  Negative for color change and rash.   Neurological:  Negative for seizures and syncope.   All other systems reviewed and are negative.        Past Medical History:   Diagnosis Date   • Anxiety    • Depression    • Hypertension    • Pneumonia    • Urinary tract infection        Past Surgical History:   Procedure Laterality Date   • HERNIA REPAIR         Family History   Problem Relation Age of Onset   • Diabetes Mother    • Cancer Father    • Cancer Brother    • Breast cancer Maternal Grandmother    • Cancer Maternal Grandmother        Social History     Occupational History   • Not on file   Tobacco Use   • Smoking status: Never   • Smokeless tobacco: Never   Vaping Use   • Vaping status: Never Used   Substance and Sexual Activity   • Alcohol use: Yes     Comment: social drinks   • Drug use: Not Currently     Types: Marijuana   • Sexual activity: Not on file         Current Outpatient Medications:   •  Ascorbic Acid 500 MG/5ML LIQD, Take by mouth, Disp: , Rfl:   •  aspirin 81 mg chewable tablet, Chew 81 mg daily, Disp: , Rfl:   •  carvedilol (COREG) 25 mg tablet, Take 25 mg by mouth 2 (two) times a day with meals, Disp: , Rfl:   •  cholestyramine (QUESTRAN) 4 GM/DOSE powder, MIX 1 SCOOP IN 2 OUNCES OF WATER AND DRINK BY MOUTH EVERY DAY, Disp: , Rfl:   •  Cobalamin Combinations (Vitamin B12-Folic Acid) 500-400 MCG TABS, Take by mouth, Disp: , Rfl:   •  diazepam (VALIUM) 10 mg tablet, Take 10 mg by mouth every 6 (six) hours as needed for anxiety, Disp: , Rfl:   •  doxycycline hyclate (VIBRAMYCIN) 100 mg capsule, Take 1 capsule twice a day by oral route for 7 days., Disp: , Rfl:   •  meloxicam (Mobic) 15 mg tablet, Take 1 tablet (15 mg total) by mouth daily, Disp: 30 tablet, Rfl: 0  •  primidone (MYSOLINE) 50 mg tablet, Take 50 mg by mouth 2 (two)  times a day, Disp: , Rfl:   •  sacubitril-valsartan (Entresto) 49-51 MG TABS, , Disp: , Rfl:   •  Turmeric 500 MG CAPS, Take 500 mg by mouth daily, Disp: , Rfl:   •  Vitamin E 45 MG (100 UNIT) CAPS, Take 100 Units by mouth daily, Disp: , Rfl:   •  losartan (COZAAR) 25 mg tablet, Take 25 mg by mouth 2 (two) times a day, Disp: , Rfl:   •  primidone (MYSOLINE) 250 mg tablet, Take 500 mg by mouth 2 (two) times a day (Patient not taking: Reported on 5/17/2024), Disp: , Rfl:     Allergies   Allergen Reactions   • Amoxicillin Myalgia       Objective:  Vitals:    05/17/24 1448   BP: 129/86   Pulse: 73       Right Knee Exam     Tenderness   The patient is experiencing tenderness in the medial joint line.    Range of Motion   The patient has normal right knee ROM.    Tests   Simone:  Medial - negative Lateral - negative  Varus: negative Valgus: negative    Other   Erythema: absent  Scars: absent  Sensation: normal  Pulse: present  Swelling: none  Effusion: no effusion present      Left Knee Exam     Tenderness   The patient is experiencing tenderness in the medial joint line.    Range of Motion   Extension:  0   Flexion:  110     Tests   Simone:  Medial - negative Lateral - negative  Varus: negative Valgus: negative    Other   Erythema: absent  Scars: absent  Sensation: normal  Pulse: present  Swelling: none  Effusion: no effusion present          Observations   Left Knee   Negative for effusion.     Right Knee   Negative for effusion.       Physical Exam  Vitals and nursing note reviewed.   Constitutional:       Appearance: Normal appearance.   HENT:      Head: Normocephalic and atraumatic.      Right Ear: External ear normal.      Left Ear: External ear normal.      Nose: Nose normal.   Eyes:      General: No scleral icterus.     Extraocular Movements: Extraocular movements intact.      Conjunctiva/sclera: Conjunctivae normal.   Cardiovascular:      Rate and Rhythm: Normal rate.   Pulmonary:      Effort: Pulmonary  effort is normal. No respiratory distress.   Musculoskeletal:      Cervical back: Normal range of motion and neck supple.      Right knee: No effusion.      Instability Tests: Medial Simone test negative and lateral Simone test negative.      Left knee: No effusion.      Instability Tests: Medial Simone test negative and lateral Simone test negative.      Comments: See ortho exam   Skin:     General: Skin is warm and dry.   Neurological:      Mental Status: He is alert and oriented to person, place, and time.   Psychiatric:         Mood and Affect: Mood normal.         Behavior: Behavior normal.         I have personally reviewed pertinent films in PACS and my interpretation is as follows:  X-rays of the bilateral knees obtained in the office today demonstrate severe tricompartmental osteoarthritis of the knees bilaterally. No acute fractures or dislocations present.      This document was created using speech voice recognition software.   Grammatical errors, random word insertions, pronoun errors, and incomplete sentences are an occasional consequence of this system due to software limitations, ambient noise, and hardware issues.   Any formal questions or concerns about content, text, or information contained within the body of this dictation should be directly addressed to the provider for clarification.

## 2024-05-17 NOTE — TELEPHONE ENCOUNTER
Caller: Harlan     Doctor: Chano    Reason for call: Was supposed to have a Rx for Meloxicam  but it was not sent to [place]    Call back#: [phone]

## 2024-05-17 NOTE — TELEPHONE ENCOUNTER
Caller: Patient    Doctor: Pattie    Reason for call:     Patient has an appointment today at 3:00 with , he called for directions, I was trying to   Get them and he hung up.  Can someone please call and assist him.  Thanks    Call back#: [phone]

## 2024-05-21 ENCOUNTER — OFFICE VISIT (OUTPATIENT)
Dept: WOUND CARE | Facility: HOSPITAL | Age: 74
End: 2024-05-21
Payer: COMMERCIAL

## 2024-05-21 VITALS
RESPIRATION RATE: 18 BRPM | DIASTOLIC BLOOD PRESSURE: 86 MMHG | HEART RATE: 70 BPM | WEIGHT: 240 LBS | BODY MASS INDEX: 35.55 KG/M2 | HEIGHT: 69 IN | TEMPERATURE: 97.3 F | SYSTOLIC BLOOD PRESSURE: 153 MMHG

## 2024-05-21 DIAGNOSIS — I87.312 CHRONIC VENOUS HYPERTENSION (IDIOPATHIC) WITH ULCER OF LEFT LOWER EXTREMITY (CODE) (HCC): ICD-10-CM

## 2024-05-21 DIAGNOSIS — I89.0 LYMPHEDEMA OF BOTH LOWER EXTREMITIES: ICD-10-CM

## 2024-05-21 DIAGNOSIS — L97.921 NON-PRESSURE CHRONIC ULCER OF LEFT LOWER LEG, LIMITED TO BREAKDOWN OF SKIN (HCC): Primary | ICD-10-CM

## 2024-05-21 DIAGNOSIS — R60.9 LIPEDEMA: ICD-10-CM

## 2024-05-21 PROCEDURE — 97597 DBRDMT OPN WND 1ST 20 CM/<: CPT | Performed by: STUDENT IN AN ORGANIZED HEALTH CARE EDUCATION/TRAINING PROGRAM

## 2024-05-21 PROCEDURE — 99213 OFFICE O/P EST LOW 20 MIN: CPT | Performed by: STUDENT IN AN ORGANIZED HEALTH CARE EDUCATION/TRAINING PROGRAM

## 2024-05-21 PROCEDURE — 99214 OFFICE O/P EST MOD 30 MIN: CPT | Performed by: STUDENT IN AN ORGANIZED HEALTH CARE EDUCATION/TRAINING PROGRAM

## 2024-05-21 RX ORDER — LIDOCAINE HYDROCHLORIDE 40 MG/ML
5 SOLUTION TOPICAL ONCE
Status: COMPLETED | OUTPATIENT
Start: 2024-05-21 | End: 2024-05-21

## 2024-05-21 RX ADMIN — LIDOCAINE HYDROCHLORIDE 5 ML: 40 SOLUTION TOPICAL at 14:36

## 2024-05-21 NOTE — PROGRESS NOTES
Wound Procedure Treatment Venous Ulcer Left;Lower Leg    Performed by: Linda Clarke RN  Authorized by: Katlyn Rossi MD    Associated wounds:   Wound 05/21/24 Venous Ulcer Leg Left;Lower  Wound cleansed with:  NSS  Applied to periwound:  Moisture lotion  Applied primary dressing:  Hydrafera blue  Applied secondary dressing:  ABD  Wound secured with:  Compression wrap    Universal Protocol:     Other Assisting Provider: No      Verbal consent obtained?: Yes      Risks and benefits: Risks, benefits and alternatives were discussed      Consent given by:  Patient    Time out performed at:  5/21/2024 2:40 PM    Patient states understanding of procedure being performed: Yes      Site marked: Yes      Patient identity confirmed:  Verbally with patient    Procedure Details  Laterality:  Left  Location:  Leg  Cast type:  Multi-layer compression short leg  Supplies:  2 layer wrap  Hydrofera Classic applied to wound, covered by ABD; Coflex TLC applied.

## 2024-05-21 NOTE — PATIENT INSTRUCTIONS
Orders Placed This Encounter   Procedures    Wound cleansing and dressings Venous Ulcer Left;Lower Leg     Unna Boot/ Multi-layer compression wrap Instructions: Coflex TLC to left lower leg    Keep compression wrap/wraps clean and dry. If wraps are too tight and you experience numbness/tingling, call the wound center. If after hours, remove wraps or proceed to nearest E.R. and call wound center in AM.    Wrap will be changed 1 x weekly at Wound Center    Avoid prolonged standing in one place.    Elevate leg(s) above the level of the heart when sitting or as much as possible.       Wound infection:  If you have signs of infection please call the wound center.  If the wound center is closed- please go to the Emergency department.  Some signs of infection:  fever, chills, increased redness, red streaks, increase in pain, increased drainage.  Drainage with an odor, Change in drainage color: white/milky/green/tan/yellow,  an increase in swelling, chest pain and/or shortness of breath.       Protein: Eat protein with each meal to promote healing.  Examples of protein are fish, meat, chicken, nuts, peanut butter, eggs, lentils, edamame or a protein shake.     Standing Status:   Future     Standing Expiration Date:   5/28/2024

## 2024-05-21 NOTE — PROGRESS NOTES
"Patient ID: Mejia Baez is a 73 y.o. male Date of Birth 1950     Chief Complaint  Chief Complaint   Patient presents with    New Patient Visit     Bilateral leg wounds       Allergies  Amoxicillin    Assessment:     Diagnoses and all orders for this visit:    Non-pressure chronic ulcer of left lower leg, limited to breakdown of skin (HCC)  -     lidocaine (XYLOCAINE) 4 % topical solution 5 mL  -     Wound cleansing and dressings Venous Ulcer Left;Lower Leg; Future  -     Wound Procedure Treatment Venous Ulcer Left;Lower Leg  -     Debridement    Chronic venous hypertension (idiopathic) with ulcer of left lower extremity (CODE) (HCC)  -     Wound cleansing and dressings Venous Ulcer Left;Lower Leg; Future  -     Wound Procedure Treatment Venous Ulcer Left;Lower Leg    Lipedema  -     Wound cleansing and dressings Venous Ulcer Left;Lower Leg; Future  -     Wound Procedure Treatment Venous Ulcer Left;Lower Leg    Lymphedema of both lower extremities  -     Wound cleansing and dressings Venous Ulcer Left;Lower Leg; Future  -     Wound Procedure Treatment Venous Ulcer Left;Lower Leg    Other orders  -     Cancel: Debridement              Debridement   Wound 05/21/24 Venous Ulcer Leg Left;Lower    Universal Protocol:  Consent: Verbal consent obtained.  Risks and benefits: risks, benefits and alternatives were discussed  Consent given by: patient  Time out: Immediately prior to procedure a \"time out\" was called to verify the correct patient, procedure, equipment, support staff and site/side marked as required.  Patient identity confirmed: verbally with patient    Debridement Details  Performed by: physician  Debridement type: selective  Pain control: lidocaine 4%      Post-debridement measurements  Length (cm): 4.7  Width (cm): 4  Depth (cm): 0.1  Percent debrided: 90%  Surface Area (cm^2): 18.8  Area Debrided (cm^2): 16.92  Volume (cm^3): 1.88    Devitalized tissue debrided: biofilm and exudate  Instrument(s) " utilized: curette  Bleeding: small  Hemostasis obtained with: pressure  Procedural pain (0-10): 1  Post-procedural pain: 0   Response to treatment: procedure was tolerated well         Plan:   It was a pleasure to see Mejia Baez for wound care consult today  Selective debridement performed today as above  Start plan of care as noted below with hydrofera classic, coflex tlc  No signs or symptoms of infection today. Patient understands that if any signs of infection start (such as increased redness, drainage, pain, fever, chills, diaphoresis), they should call our office or proceed to the ER or Urgent Care.  Patient should continue a high protein diet to facilitate wound healing  Patient is advised to not submerge wound or leave wound open to air.  Follow up in 1 weeks  Given the multi-factorial nature of wound care, additional time was taken to review patient's treatment plan with other specialties and most recent pertinent lab work and imaging.   All plans of care discussed with patient at bedside who verbalized understanding with treatment plan.    Wound 05/21/24 Venous Ulcer Leg Left;Lower (Active)   Wound Image Images linked 05/21/24 1433   Wound Description Pink;White;Epithelialization 05/21/24 1432   Jessica-wound Assessment Edema;Dry;Pink 05/21/24 1432   Wound Length (cm) 4.7 cm 05/21/24 1432   Wound Width (cm) 4 cm 05/21/24 1432   Wound Depth (cm) 0.1 cm 05/21/24 1432   Wound Surface Area (cm^2) 18.8 cm^2 05/21/24 1432   Wound Volume (cm^3) 1.88 cm^3 05/21/24 1432   Calculated Wound Volume (cm^3) 1.88 cm^3 05/21/24 1432   Drainage Amount Moderate 05/21/24 1432   Drainage Description Serosanguineous 05/21/24 1432   Non-staged Wound Description Full thickness 05/21/24 1432   Dressing Status Intact (upon arrival) 05/21/24 1432       Wound 05/21/24 Venous Ulcer Leg Left;Lower (Active)   Date First Assessed/Time First Assessed: 05/21/24 1428   Primary Wound Type: Venous Ulcer  Location: Leg  Wound Location  "Orientation: Left;Lower       Subjective:      .    5/21/24: Consult - Mejia is a pleasant 73-year-old male with a past medical history of obesity, HFpEF G1DD, htn, ckd, chronic venous stasis disease here today for initial wound care consult.  He is well-known to wound manage in the past surgery and her chronic venous stasis ulcers.  He was last discharged from wound management 2 months ago.  He was seen by vascular surgery at [facility] last month and advised to wear compression stockings 20 to 30 mmHg for treatment of venous insufficiency.  He does find these compression stockings difficult to get on.  Currently is not using compression and is being refitted for Lymphapress pumps.  Compliant with diuretic.  No symptoms of infection.        The following portions of the patient's history were reviewed and updated as appropriate: allergies, current medications, past family history, past medical history, past social history, past surgical history, and problem list  .    Review of Systems   Constitutional:  Negative for chills, diaphoresis and fever.   Skin:  Positive for wound.   All other systems reviewed and are negative.        Objective:       Wound 05/21/24 Venous Ulcer Leg Left;Lower (Active)   Wound Image Images linked 05/21/24 1433   Wound Description Pink;White;Epithelialization 05/21/24 1432   Jessica-wound Assessment Edema;Dry;Pink 05/21/24 1432   Wound Length (cm) 4.7 cm 05/21/24 1432   Wound Width (cm) 4 cm 05/21/24 1432   Wound Depth (cm) 0.1 cm 05/21/24 1432   Wound Surface Area (cm^2) 18.8 cm^2 05/21/24 1432   Wound Volume (cm^3) 1.88 cm^3 05/21/24 1432   Calculated Wound Volume (cm^3) 1.88 cm^3 05/21/24 1432   Drainage Amount Moderate 05/21/24 1432   Drainage Description Serosanguineous 05/21/24 1432   Non-staged Wound Description Full thickness 05/21/24 1432   Dressing Status Intact (upon arrival) 05/21/24 1432       /86   Pulse 70   Temp (!) 97.3 °F (36.3 °C)   Resp 18   Ht 5' 9\" " (1.753 m)   Wt 109 kg (240 lb)   BMI 35.44 kg/m²     Physical Exam  Vitals reviewed.   Constitutional:       Appearance: Normal appearance.   HENT:      Head: Normocephalic and atraumatic.   Eyes:      Extraocular Movements: Extraocular movements intact.   Pulmonary:      Effort: Pulmonary effort is normal.   Musculoskeletal:      Cervical back: Neck supple.      Left lower leg: Edema present.   Skin:     Comments: 3 discrete wounds of the lateral distal left lower extremity.  No signs of infection.  Serosanguinous exudate. Healthy wound beds   Neurological:      Mental Status: He is alert.   Psychiatric:         Mood and Affect: Mood normal.           Wound Instructions:  Orders Placed This Encounter   Procedures    Wound cleansing and dressings Venous Ulcer Left;Lower Leg     Unna Boot/ Multi-layer compression wrap Instructions: Coflex TLC to left lower leg    Keep compression wrap/wraps clean and dry. If wraps are too tight and you experience numbness/tingling, call the wound center. If after hours, remove wraps or proceed to nearest E.R. and call wound center in AM.    Wrap will be changed 1 x weekly at Wound Center    Avoid prolonged standing in one place.    Elevate leg(s) above the level of the heart when sitting or as much as possible.       Wound infection:  If you have signs of infection please call the wound center.  If the wound center is closed- please go to the Emergency department.  Some signs of infection:  fever, chills, increased redness, red streaks, increase in pain, increased drainage.  Drainage with an odor, Change in drainage color: white/milky/green/tan/yellow,  an increase in swelling, chest pain and/or shortness of breath.       Protein: Eat protein with each meal to promote healing.  Examples of protein are fish, meat, chicken, nuts, peanut butter, eggs, lentils, edamame or a protein shake.     Standing Status:   Future     Standing Expiration Date:   5/28/2024    Wound Procedure  "Treatment Venous Ulcer Left;Lower Leg     This order was created via procedure documentation    Debridement     This order was created via procedure documentation        Diagnosis ICD-10-CM Associated Orders   1. Non-pressure chronic ulcer of left lower leg, limited to breakdown of skin ([facility])  L97.921 lidocaine (XYLOCAINE) 4 % topical solution 5 mL     Wound cleansing and dressings Venous Ulcer Left;Lower Leg     Wound Procedure Treatment Venous Ulcer Left;Lower Leg     Debridement      2. Chronic venous hypertension (idiopathic) with ulcer of left lower extremity (CODE) ([facility])  I87.312 Wound cleansing and dressings Venous Ulcer Left;Lower Leg     Wound Procedure Treatment Venous Ulcer Left;Lower Leg      3. Lipedema  R60.9 Wound cleansing and dressings Venous Ulcer Left;Lower Leg     Wound Procedure Treatment Venous Ulcer Left;Lower Leg      4. Lymphedema of both lower extremities  I89.0 Wound cleansing and dressings Venous Ulcer Left;Lower Leg     Wound Procedure Treatment Venous Ulcer Left;Lower Leg          --  Katlyn Rossi MD    \"This note has been constructed using a voice recognition system. Therefore there may be syntax, spelling, and/or grammatical errors. Occasional wrong word or \"sound alike\" substitutions may have occurred due to the inherent limitations of voice recognition software. Read the chart carefully and recognize, using context, where substitutions have occurred. Please call if you have any questions.\"     "

## 2024-05-28 ENCOUNTER — OFFICE VISIT (OUTPATIENT)
Dept: WOUND CARE | Facility: HOSPITAL | Age: 74
End: 2024-05-28
Payer: COMMERCIAL

## 2024-05-28 VITALS
DIASTOLIC BLOOD PRESSURE: 86 MMHG | HEART RATE: 66 BPM | RESPIRATION RATE: 22 BRPM | SYSTOLIC BLOOD PRESSURE: 178 MMHG | TEMPERATURE: 98 F

## 2024-05-28 DIAGNOSIS — I87.312 CHRONIC VENOUS HYPERTENSION (IDIOPATHIC) WITH ULCER OF LEFT LOWER EXTREMITY (CODE) (HCC): ICD-10-CM

## 2024-05-28 DIAGNOSIS — R60.9 LIPEDEMA: ICD-10-CM

## 2024-05-28 DIAGNOSIS — I89.0 LYMPHEDEMA OF BOTH LOWER EXTREMITIES: ICD-10-CM

## 2024-05-28 DIAGNOSIS — L97.921 NON-PRESSURE CHRONIC ULCER OF LEFT LOWER LEG, LIMITED TO BREAKDOWN OF SKIN (HCC): Primary | ICD-10-CM

## 2024-05-28 PROCEDURE — 97597 DBRDMT OPN WND 1ST 20 CM/<: CPT | Performed by: STUDENT IN AN ORGANIZED HEALTH CARE EDUCATION/TRAINING PROGRAM

## 2024-05-28 PROCEDURE — 99204 OFFICE O/P NEW MOD 45 MIN: CPT | Performed by: STUDENT IN AN ORGANIZED HEALTH CARE EDUCATION/TRAINING PROGRAM

## 2024-05-28 NOTE — PATIENT INSTRUCTIONS
Orders Placed This Encounter   Procedures    Wound cleansing and dressings     Cleanse left leg wound with soap and water, pat dry  Apply Mepilex lite   Change dressing 3 times a week  Apply spandigrip G, wear every day and remove at night        Wound infection:  If you have signs of infection please call the wound center.  If the wound center is closed- please go to the Emergency department.  Some signs of infection:  fever, chills, increased redness, red streaks, increase in pain, increased drainage.  Drainage with an odor, Change in drainage color: white/milky/green/tan/yellow,  an increase in swelling, chest pain and/or shortness of breath.         Protein: Eat protein with each meal to promote healing.  Examples of protein are fish, meat, chicken, nuts, peanut butter, eggs, lentils, edamame or a protein shake.     Standing Status:   Future     Standing Expiration Date:   6/4/2024

## 2024-05-28 NOTE — PROGRESS NOTES
"Patient ID: Mejia Baez is a 73 y.o. male Date of Birth 1950     Chief Complaint  Chief Complaint   Patient presents with    Follow Up Wound Care Visit       Allergies  Amoxicillin    Assessment:     Diagnoses and all orders for this visit:    Non-pressure chronic ulcer of left lower leg, limited to breakdown of skin (HCC)  -     Wound cleansing and dressings; Future  -     Debridement    Chronic venous hypertension (idiopathic) with ulcer of left lower extremity (CODE) (HCC)    Lipedema    Lymphedema of both lower extremities              Debridement   Wound 05/28/24 Pretibial Left;Proximal    Universal Protocol:  Consent: Verbal consent obtained.  Risks and benefits: risks, benefits and alternatives were discussed  Consent given by: patient  Time out: Immediately prior to procedure a \"time out\" was called to verify the correct patient, procedure, equipment, support staff and site/side marked as required.  Patient identity confirmed: verbally with patient    Debridement Details  Performed by: physician  Debridement type: selective  Pain control: lidocaine 4%      Post-debridement measurements  Length (cm): 0.8  Width (cm): 0.3  Depth (cm): 0.1  Percent debrided: 90%  Surface Area (cm^2): 0.24  Area Debrided (cm^2): 0.22  Volume (cm^3): 0.02    Devitalized tissue debrided: biofilm and exudate  Instrument(s) utilized: curette  Bleeding: small  Hemostasis obtained with: pressure  Procedural pain (0-10): 1  Post-procedural pain: 0   Response to treatment: procedure was tolerated well        Plan:   It was a pleasure to see Mejia Baez for wound care follow up today  Selective debridement performed today as above of new venous wound. Prior wound is fully epithelialized today  Wound is improving   Continue plan of care as noted below with bordered foam lite, spandigrip  No signs or symptoms of infection today. Patient understands that if any signs of infection start (such as increased redness, drainage, " pain, fever, chills, diaphoresis), they should call our office or proceed to the ER or Urgent Care.  Patient should continue a high protein diet to facilitate wound healing  Patient is advised to not submerge wound or leave wound open to air.  Follow up in 1 weeks  Given the multi-factorial nature of wound care, additional time was taken to review patient's treatment plan with other specialties and most recent pertinent lab work and imaging.   All plans of care discussed with patient at bedside who verbalized understanding with treatment plan.    Wound 05/21/24 Venous Ulcer Leg Left;Lower (Active)   Wound Image Images linked 05/28/24 1128   Wound Description Epithelialization 05/28/24 1128   Jessica-wound Assessment Edema;Dry;[place] 05/28/24 1128   Wound Length (cm) 0 cm 05/28/24 1128   Wound Width (cm) 0 cm 05/28/24 1128   Wound Depth (cm) 0 cm 05/28/24 1128   Wound Surface Area (cm^2) 0 cm^2 05/28/24 1128   Wound Volume (cm^3) 0 cm^3 05/28/24 1128   Calculated Wound Volume (cm^3) 0 cm^3 05/28/24 1128   Change in Wound Size % 100 05/28/24 1128   Drainage Amount None 05/28/24 1128       Wound 05/28/24 Pretibial Left;Proximal (Active)   Wound Image Images linked 05/28/24 1132   Wound Description Beefy red;Pink 05/28/24 1132   Jessica-wound Assessment Edema;Excoriated 05/28/24 1132   Wound Length (cm) 0.8 cm 05/28/24 1132   Wound Width (cm) 0.3 cm 05/28/24 1132   Wound Depth (cm) 0.1 cm 05/28/24 1132   Wound Surface Area (cm^2) 0.24 cm^2 05/28/24 1132   Wound Volume (cm^3) 0.024 cm^3 05/28/24 1132   Calculated Wound Volume (cm^3) 0.02 cm^3 05/28/24 1132   Drainage Amount Scant 05/28/24 1132   Drainage Description Bloody 05/28/24 1132   Non-staged Wound Description Full thickness 05/28/24 1132       Wound 05/21/24 Venous Ulcer Leg Left;Lower (Active)   Date First Assessed/Time First Assessed: 05/21/24 1428   Primary Wound Type: Venous Ulcer  Location: Leg  Wound Location Orientation: Left;Lower       Wound 05/28/24 Pretibial  Left;Proximal (Active)   Date First Assessed/Time First Assessed: 05/28/24 1132   Location: Pretibial  Wound Location Orientation: Left;Proximal       Subjective:      .    5/28/24: Patient cut top portion of coflex wrap to relieve discomfort caused by increased LLE edema. Notes new wound where the cut the wrap. No fever, chills, diaphoresis.    5/21/24: Consult - Mejia is a pleasant 73-year-old male with a past medical history of obesity, HFpEF G1DD, htn, ckd, chronic venous stasis disease here today for initial wound care consult.  He is well-known to wound manage in the past surgery and her chronic venous stasis ulcers.  He was last discharged from wound management 2 months ago.  He was seen by vascular surgery at [facility] last month and advised to wear compression stockings 20 to 30 mmHg for treatment of venous insufficiency.  He does find these compression stockings difficult to get on.  Currently is not using compression and is being refitted for Lymphapress pumps.  Compliant with diuretic.  No symptoms of infection.        The following portions of the patient's history were reviewed and updated as appropriate: allergies, current medications, past family history, past medical history, past social history, past surgical history, and problem list.    Review of Systems   Constitutional:  Negative for chills, diaphoresis and fever.   Skin:  Positive for wound.   All other systems reviewed and are negative.        Objective:       Wound 05/21/24 Venous Ulcer Leg Left;Lower (Active)   Wound Image Images linked 05/28/24 1128   Wound Description Epithelialization 05/28/24 1128   Jessica-wound Assessment Edema;Dry;[place] 05/28/24 1128   Wound Length (cm) 0 cm 05/28/24 1128   Wound Width (cm) 0 cm 05/28/24 1128   Wound Depth (cm) 0 cm 05/28/24 1128   Wound Surface Area (cm^2) 0 cm^2 05/28/24 1128   Wound Volume (cm^3) 0 cm^3 05/28/24 1128   Calculated Wound Volume (cm^3) 0 cm^3 05/28/24 1128   Change in Wound Size  % 100 05/28/24 1128   Drainage Amount None 05/28/24 1128       Wound 05/28/24 Pretibial Left;Proximal (Active)   Wound Image Images linked 05/28/24 1132   Wound Description Beefy red;Pink 05/28/24 1132   Jessica-wound Assessment Edema;Excoriated 05/28/24 1132   Wound Length (cm) 0.8 cm 05/28/24 1132   Wound Width (cm) 0.3 cm 05/28/24 1132   Wound Depth (cm) 0.1 cm 05/28/24 1132   Wound Surface Area (cm^2) 0.24 cm^2 05/28/24 1132   Wound Volume (cm^3) 0.024 cm^3 05/28/24 1132   Calculated Wound Volume (cm^3) 0.02 cm^3 05/28/24 1132   Drainage Amount Scant 05/28/24 1132   Drainage Description Bloody 05/28/24 1132   Non-staged Wound Description Full thickness 05/28/24 1132       BP (!) 178/86   Pulse 66   Temp 98 °F (36.7 °C)   Resp 22     Physical Exam  Vitals reviewed.   Constitutional:       Appearance: Normal appearance.   HENT:      Head: Normocephalic and atraumatic.   Eyes:      Extraocular Movements: Extraocular movements intact.   Pulmonary:      Effort: Pulmonary effort is normal.   Musculoskeletal:      Cervical back: Neck supple.      Left lower leg: Edema present.   Skin:     Comments: Lateral distal LLE wound is fully epithelialized today.    New wound of anterior medial LLE. Sanguinous exudate. No infection.   Neurological:      Mental Status: He is alert.   Psychiatric:         Mood and Affect: Mood normal.                 Wound Instructions:  Orders Placed This Encounter   Procedures    Wound cleansing and dressings     Cleanse left leg wound with soap and water, pat dry  Apply Mepilex lite   Change dressing 3 times a week  Apply spandigrip G, wear every day and remove at night        Wound infection:  If you have signs of infection please call the wound center.  If the wound center is closed- please go to the Emergency department.  Some signs of infection:  fever, chills, increased redness, red streaks, increase in pain, increased drainage.  Drainage with an odor, Change in drainage color:  "white/milky/green/tan/yellow,  an increase in swelling, chest pain and/or shortness of breath.         Protein: Eat protein with each meal to promote healing.  Examples of protein are fish, meat, chicken, nuts, peanut butter, eggs, lentils, edamame or a protein shake.     Standing Status:   Future     Standing Expiration Date:   6/4/2024    Debridement     This order was created via procedure documentation        Diagnosis ICD-10-CM Associated Orders   1. Non-pressure chronic ulcer of left lower leg, limited to breakdown of skin ([facility])  L97.921 Wound cleansing and dressings     Debridement      2. Chronic venous hypertension (idiopathic) with ulcer of left lower extremity (CODE) ([facility])  I87.312       3. Lipedema  R60.9       4. Lymphedema of both lower extremities  I89.0           --  Katlyn Rossi MD    \"This note has been constructed using a voice recognition system. Therefore there may be syntax, spelling, and/or grammatical errors. Occasional wrong word or \"sound alike\" substitutions may have occurred due to the inherent limitations of voice recognition software. Read the chart carefully and recognize, using context, where substitutions have occurred. Please call if you have any questions.\"     "

## 2024-05-31 ENCOUNTER — TELEPHONE (OUTPATIENT)
Dept: OBGYN CLINIC | Facility: HOSPITAL | Age: 74
End: 2024-05-31

## 2024-05-31 NOTE — TELEPHONE ENCOUNTER
Caller: Patient    Doctor: Chano    Reason for call: Patient calling to check on the status of gel injections. Informed patient it's still being verified by his insurance.     Call back#: [phone]

## 2024-06-04 ENCOUNTER — OFFICE VISIT (OUTPATIENT)
Dept: WOUND CARE | Facility: HOSPITAL | Age: 74
End: 2024-06-04
Payer: COMMERCIAL

## 2024-06-04 VITALS
TEMPERATURE: 97.8 F | DIASTOLIC BLOOD PRESSURE: 95 MMHG | HEART RATE: 72 BPM | RESPIRATION RATE: 15 BRPM | SYSTOLIC BLOOD PRESSURE: 167 MMHG

## 2024-06-04 DIAGNOSIS — I87.312 CHRONIC VENOUS HYPERTENSION (IDIOPATHIC) WITH ULCER OF LEFT LOWER EXTREMITY (CODE) (HCC): ICD-10-CM

## 2024-06-04 DIAGNOSIS — R60.9 LIPEDEMA: ICD-10-CM

## 2024-06-04 DIAGNOSIS — I89.0 LYMPHEDEMA OF BOTH LOWER EXTREMITIES: ICD-10-CM

## 2024-06-04 DIAGNOSIS — L97.921 NON-PRESSURE CHRONIC ULCER OF LEFT LOWER LEG, LIMITED TO BREAKDOWN OF SKIN (HCC): Primary | ICD-10-CM

## 2024-06-04 PROCEDURE — 99212 OFFICE O/P EST SF 10 MIN: CPT | Performed by: STUDENT IN AN ORGANIZED HEALTH CARE EDUCATION/TRAINING PROGRAM

## 2024-06-04 NOTE — PATIENT INSTRUCTIONS
Orders Placed This Encounter   Procedures    Wound cleansing and dressings     Left Leg Wound is healed.    Shower: Yes  Moisturize your legs daily.  Wear compression socks, up to 20 mm Hg strength, daily.    You are discharged from the Wound Center.  If you have any issues/concerns, please call the Wound Center.      Compression Stocking: up to 20 mm Hg strength.    Remove compression stockings every night at bedtime and re-apply first thing every morning. Follow daily skin care as instructed.    Avoid prolonged standing in one place.    Elevate leg(s) above the level of the heart when sitting or as much as possible.      You may order Spandagrip, Size G on [employer] if you wish. Spandagrip is what you wore for compression at the Wound Center.     Standing Status:   Future     Standing Expiration Date:   6/11/2024

## 2024-06-04 NOTE — PROGRESS NOTES
Patient ID: Mejia Baez is a 73 y.o. male Date of Birth 1950     Chief Complaint  Chief Complaint   Patient presents with    Follow Up Wound Care Visit     LLE       Allergies  Amoxicillin    Assessment:     Diagnoses and all orders for this visit:    Non-pressure chronic ulcer of left lower leg, limited to breakdown of skin (HCC)  -     Cancel: Wound cleansing and dressings; Future  -     Wound cleansing and dressings; Future    Chronic venous hypertension (idiopathic) with ulcer of left lower extremity (CODE) (HCC)  -     Cancel: Wound cleansing and dressings; Future  -     Wound cleansing and dressings; Future    Lipedema  -     Cancel: Wound cleansing and dressings; Future  -     Wound cleansing and dressings; Future    Lymphedema of both lower extremities  -     Cancel: Wound cleansing and dressings; Future  -     Wound cleansing and dressings; Future                Plan:   It was a pleasure to see Mejia Baez for wound care follow up today  Left lower extremity wound is fully epithelialized today.  Patient advised protect newly healed skin with edema control and moisturization.  Discharge wound management today with follow-up as needed in the future.  All plans of care discussed with patient at bedside who verbalized understanding with treatment plan.    [REMOVED] Wound 05/28/24 Pretibial Left;Proximal (Removed)   Wound Image Images linked 06/04/24 1425   Wound Description Other (Comment);Epithelialization (scab) 06/04/24 1424   Jessica-wound Assessment Pink;Edema 06/04/24 1424   Wound Length (cm) 0 cm 06/04/24 1424   Wound Width (cm) 0 cm 06/04/24 1424   Wound Depth (cm) 0 cm 06/04/24 1424   Wound Surface Area (cm^2) 0 cm^2 06/04/24 1424   Wound Volume (cm^3) 0 cm^3 06/04/24 1424   Calculated Wound Volume (cm^3) 0 cm^3 06/04/24 1424   Change in Wound Size % 100 06/04/24 1424   Drainage Amount None 06/04/24 1424   Non-staged Wound Description Not applicable 06/04/24 1424   Dressing Status Intact  (upon arrival) 06/04/24 1424       [REMOVED] Wound 05/21/24 Venous Ulcer Leg Left;Lower (Removed)   Resolved Date: 05/25/24  Date First Assessed/Time First Assessed: 05/21/24 1428   Primary Wound Type: Venous Ulcer  Location: Leg  Wound Location Orientation: Left;Lower  Wound Outcome: Healed       [REMOVED] Wound 05/28/24 Pretibial Left;Proximal (Removed)   Resolved Date/Resolved Time: 06/04/24 1432  Date First Assessed/Time First Assessed: 05/28/24 1132   Location: Pretibial  Wound Location Orientation: Left;Proximal  Wound Outcome: Healed       Subjective:      .    6/4/24: Tolerated compression wrapping well.  Happy with wound healing.    5/28/24: Patient cut top portion of coflex wrap to relieve discomfort caused by increased LLE edema. Notes new wound where the cut the wrap. No fever, chills, diaphoresis.     5/21/24: Consult - Mejia is a pleasant 73-year-old male with a past medical history of obesity, HFpEF G1DD, htn, ckd, chronic venous stasis disease here today for initial wound care consult.  He is well-known to wound manage in the past surgery and her chronic venous stasis ulcers.  He was last discharged from wound management 2 months ago.  He was seen by vascular surgery at [facility] last month and advised to wear compression stockings 20 to 30 mmHg for treatment of venous insufficiency.  He does find these compression stockings difficult to get on.  Currently is not using compression and is being refitted for Lymphapress pumps.  Compliant with diuretic.  No symptoms of infection.             The following portions of the patient's history were reviewed and updated as appropriate: allergies, current medications, past family history, past medical history, past social history, past surgical history, and problem list.    Review of Systems   Constitutional:  Negative for chills, diaphoresis and fever.   Skin:  Negative for wound.   All other systems reviewed and are negative.        Objective:        [REMOVED] Wound 05/28/24 Pretibial Left;Proximal (Removed)   Wound Image Images linked 06/04/24 1425   Wound Description Other (Comment);Epithelialization (scab) 06/04/24 1424   Jessica-wound Assessment Pink;Edema 06/04/24 1424   Wound Length (cm) 0 cm 06/04/24 1424   Wound Width (cm) 0 cm 06/04/24 1424   Wound Depth (cm) 0 cm 06/04/24 1424   Wound Surface Area (cm^2) 0 cm^2 06/04/24 1424   Wound Volume (cm^3) 0 cm^3 06/04/24 1424   Calculated Wound Volume (cm^3) 0 cm^3 06/04/24 1424   Change in Wound Size % 100 06/04/24 1424   Drainage Amount None 06/04/24 1424   Non-staged Wound Description Not applicable 06/04/24 1424   Dressing Status Intact (upon arrival) 06/04/24 1424       /95   Pulse 72   Temp 97.8 °F (36.6 °C)   Resp 15     Physical Exam  Vitals reviewed.   Constitutional:       Appearance: Normal appearance.   HENT:      Head: Normocephalic and atraumatic.   Eyes:      Extraocular Movements: Extraocular movements intact.   Pulmonary:      Effort: Pulmonary effort is normal.   Musculoskeletal:      Cervical back: Neck supple.      Left lower leg: Edema present.   Skin:     Comments: Left lower extremity wound fully epithelialized.  No open wound or exudate.   Neurological:      Mental Status: He is alert.   Psychiatric:         Mood and Affect: Mood normal.           Wound Instructions:  Orders Placed This Encounter   Procedures    Wound cleansing and dressings     Left Leg Wound is healed.    Shower: Yes  Moisturize your legs daily.  Wear compression socks, up to 20 mm Hg strength, daily.    You are discharged from the Wound Center.  If you have any issues/concerns, please call the Wound Center.      Compression Stocking: up to 20 mm Hg strength.    Remove compression stockings every night at bedtime and re-apply first thing every morning. Follow daily skin care as instructed.    Avoid prolonged standing in one place.    Elevate leg(s) above the level of the heart when sitting or as much as  "possible.      You may order Spandagrip, Size G on [employer] if you wish. Spandagrip is what you wore for compression at the Wound Center.     Standing Status:   Future     Standing Expiration Date:   6/11/2024        Diagnosis ICD-10-CM Associated Orders   1. Non-pressure chronic ulcer of left lower leg, limited to breakdown of skin ([facility])  L97.921 Wound cleansing and dressings      2. Chronic venous hypertension (idiopathic) with ulcer of left lower extremity (CODE) ([facility])  I87.312 Wound cleansing and dressings      3. Lipedema  R60.9 Wound cleansing and dressings      4. Lymphedema of both lower extremities  I89.0 Wound cleansing and dressings          --  Katlyn Rossi MD    \"This note has been constructed using a voice recognition system. Therefore there may be syntax, spelling, and/or grammatical errors. Occasional wrong word or \"sound alike\" substitutions may have occurred due to the inherent limitations of voice recognition software. Read the chart carefully and recognize, using context, where substitutions have occurred. Please call if you have any questions.\"     "

## 2024-06-05 ENCOUNTER — TELEPHONE (OUTPATIENT)
Dept: OBGYN CLINIC | Facility: HOSPITAL | Age: 74
End: 2024-06-05

## 2024-06-05 NOTE — TELEPHONE ENCOUNTER
Caller: Patient    Doctor: Chano    Reason for call: Patient is aware of gel injection copay and wants to know if there are other gel injections that cost less.     Call back#: [phone]

## 2024-06-06 ENCOUNTER — TELEPHONE (OUTPATIENT)
Dept: RADIOLOGY | Facility: MEDICAL CENTER | Age: 74
End: 2024-06-06

## 2024-06-10 ENCOUNTER — TELEPHONE (OUTPATIENT)
Age: 74
End: 2024-06-10

## 2024-06-10 NOTE — TELEPHONE ENCOUNTER
Caller: patient    Doctor: Bonnie    Reason for call: patient states that the meloxicam medication is not helping with the pain/discomfort. Is there something else that the patient can be prescribed?    Patient uses the [facility].    Call back#: [phone]-Do not leave a vm!      -Patient states [facility] pharmacy will be making a payment for the gel injections.

## 2024-06-19 ENCOUNTER — PROCEDURE VISIT (OUTPATIENT)
Dept: OBGYN CLINIC | Facility: CLINIC | Age: 74
End: 2024-06-19
Payer: COMMERCIAL

## 2024-06-19 DIAGNOSIS — M17.0 PRIMARY OSTEOARTHRITIS OF BOTH KNEES: Primary | ICD-10-CM

## 2024-06-19 PROCEDURE — 20610 DRAIN/INJ JOINT/BURSA W/O US: CPT | Performed by: PHYSICIAN ASSISTANT

## 2024-06-19 NOTE — PROGRESS NOTES
Assessment/Plan:  1. Primary osteoarthritis of both knees          Patient tolerated injections well. Post injection instructions provided. FU 1 week for orthovisc #2 bilateral knees. Cane given to patient today.   Subjective:   Mejia Baez is a 73 y.o. male who presents today for orthovisc #1 bilateral knees.      Review of Systems   Constitutional: Negative.  Negative for chills and fever.   HENT: Negative.  Negative for ear pain and sore throat.    Eyes: Negative.  Negative for pain and redness.   Respiratory: Negative.  Negative for shortness of breath and wheezing.    Cardiovascular:  Negative for chest pain and palpitations.   Gastrointestinal: Negative.  Negative for abdominal pain and blood in stool.   Endocrine: Negative.  Negative for polydipsia and polyuria.   Genitourinary: Negative.  Negative for difficulty urinating and dysuria.   Musculoskeletal:         As noted in HPI   Skin: Negative.  Negative for pallor and rash.   Neurological: Negative.  Negative for dizziness and numbness.   Hematological: Negative.  Negative for adenopathy. Does not bruise/bleed easily.   Psychiatric/Behavioral: Negative.  Negative for confusion and suicidal ideas.          Past Medical History:   Diagnosis Date    Anxiety     Depression     Hypertension     Pneumonia     Urinary tract infection        Past Surgical History:   Procedure Laterality Date    HERNIA REPAIR         Family History   Problem Relation Age of Onset    Diabetes Mother     Cancer Father     Cancer Brother     Breast cancer Maternal Grandmother     Cancer Maternal Grandmother        Social History     Occupational History    Not on file   Tobacco Use    Smoking status: Never    Smokeless tobacco: Never   Vaping Use    Vaping status: Never Used   Substance and Sexual Activity    Alcohol use: Yes     Comment: social drinks    Drug use: Not Currently     Types: Marijuana    Sexual activity: Not on file         Current Outpatient Medications:      Ascorbic Acid 500 MG/5ML LIQD, Take by mouth, Disp: , Rfl:     aspirin 81 mg chewable tablet, Chew 81 mg daily, Disp: , Rfl:     carvedilol (COREG) 25 mg tablet, Take 25 mg by mouth 2 (two) times a day with meals, Disp: , Rfl:     cholestyramine (QUESTRAN) 4 GM/DOSE powder, MIX 1 SCOOP IN 2 OUNCES OF WATER AND DRINK BY MOUTH EVERY DAY, Disp: , Rfl:     Cobalamin Combinations (Vitamin B12-Folic Acid) 500-400 MCG TABS, Take by mouth, Disp: , Rfl:     diazepam (VALIUM) 10 mg tablet, Take 10 mg by mouth every 6 (six) hours as needed for anxiety, Disp: , Rfl:     doxycycline hyclate (VIBRAMYCIN) 100 mg capsule, Take 1 capsule twice a day by oral route for 7 days., Disp: , Rfl:     losartan (COZAAR) 25 mg tablet, Take 25 mg by mouth 2 (two) times a day, Disp: , Rfl:     meloxicam (Mobic) 15 mg tablet, Take 1 tablet (15 mg total) by mouth daily, Disp: 30 tablet, Rfl: 0    primidone (MYSOLINE) 250 mg tablet, Take 500 mg by mouth 2 (two) times a day (Patient not taking: Reported on 5/17/2024), Disp: , Rfl:     primidone (MYSOLINE) 50 mg tablet, Take 50 mg by mouth 2 (two) times a day, Disp: , Rfl:     sacubitril-valsartan (Entresto) 49-51 MG TABS, , Disp: , Rfl:     Turmeric 500 MG CAPS, Take 500 mg by mouth daily, Disp: , Rfl:     Vitamin E 45 MG (100 UNIT) CAPS, Take 100 Units by mouth daily, Disp: , Rfl:     Allergies   Allergen Reactions    Amoxicillin Myalgia       Objective:  There were no vitals filed for this visit.  Pain Score:   9      Ortho Exam    Physical Exam  Constitutional:       General: He is not in acute distress.     Appearance: He is well-developed.   HENT:      Head: Normocephalic and atraumatic.   Eyes:      General: No scleral icterus.     Conjunctiva/sclera: Conjunctivae normal.   Neck:      Vascular: No JVD.   Cardiovascular:      Rate and Rhythm: Normal rate.   Pulmonary:      Effort: Pulmonary effort is normal. No respiratory distress.   Musculoskeletal:      Comments: As per HPI   Skin:      General: Skin is warm.   Neurological:      Mental Status: He is alert and oriented to person, place, and time.      Coordination: Coordination normal.       Large joint arthrocentesis: L knee  Universal Protocol:  Risks and benefits: risks, benefits and alternatives were discussed  Consent given by: patient  Site marked: the operative site was marked  Supporting Documentation  Indications: pain   Procedure Details  Location: knee - L knee  Preparation: Patient was prepped and draped in the usual sterile fashion (chlorhexidine)  Needle size: 22 G  Ultrasound guidance: no  Approach: anterolateral  Medications administered: 30 mg sodium hyaluronate 30 mg/2 mL    Patient tolerance: patient tolerated the procedure well with no immediate complications  Dressing:  Sterile dressing applied      Large joint arthrocentesis: R knee  Universal Protocol:  Risks and benefits: risks, benefits and alternatives were discussed  Consent given by: patient  Site marked: the operative site was marked  Supporting Documentation  Indications: pain   Procedure Details  Location: knee - R knee  Preparation: Patient was prepped and draped in the usual sterile fashion (chlorhexidine)  Needle size: 22 G  Ultrasound guidance: no  Approach: anterolateral  Medications administered: 30 mg sodium hyaluronate 30 mg/2 mL    Patient tolerance: patient tolerated the procedure well with no immediate complications  Dressing:  Sterile dressing applied              This document was created using speech voice recognition software.   Grammatical errors, random word insertions, pronoun errors, and incomplete sentences are an occasional consequence of this system due to software limitations, ambient noise, and hardware issues.   Any formal questions or concerns about content, text, or information contained within the body of this dictation should be directly addressed to the provider for clarification.

## 2024-06-26 ENCOUNTER — PROCEDURE VISIT (OUTPATIENT)
Dept: OBGYN CLINIC | Facility: CLINIC | Age: 74
End: 2024-06-26
Payer: COMMERCIAL

## 2024-06-26 DIAGNOSIS — M17.0 PRIMARY OSTEOARTHRITIS OF BOTH KNEES: Primary | ICD-10-CM

## 2024-06-26 PROCEDURE — 20610 DRAIN/INJ JOINT/BURSA W/O US: CPT | Performed by: PHYSICIAN ASSISTANT

## 2024-06-26 NOTE — PROGRESS NOTES
Assessment/Plan:  1. Primary osteoarthritis of both knees          The patient tolerated injections well. Post injection instructions provided. FU 1 week for orthovisc #3 bilateral knees.     Subjective:   Mejia Baez is a 73 y.o. male who presents today for orthovisc #2 bilateral knees.       Review of Systems   Constitutional: Negative.  Negative for chills and fever.   HENT: Negative.  Negative for ear pain and sore throat.    Eyes: Negative.  Negative for pain and redness.   Respiratory: Negative.  Negative for shortness of breath and wheezing.    Cardiovascular:  Negative for chest pain and palpitations.   Gastrointestinal: Negative.  Negative for abdominal pain and blood in stool.   Endocrine: Negative.  Negative for polydipsia and polyuria.   Genitourinary: Negative.  Negative for difficulty urinating and dysuria.   Musculoskeletal:         As noted in HPI   Skin: Negative.  Negative for pallor and rash.   Neurological: Negative.  Negative for dizziness and numbness.   Hematological: Negative.  Negative for adenopathy. Does not bruise/bleed easily.   Psychiatric/Behavioral: Negative.  Negative for confusion and suicidal ideas.          Past Medical History:   Diagnosis Date    Anxiety     Depression     Hypertension     Pneumonia     Urinary tract infection        Past Surgical History:   Procedure Laterality Date    HERNIA REPAIR         Family History   Problem Relation Age of Onset    Diabetes Mother     Cancer Father     Cancer Brother     Breast cancer Maternal Grandmother     Cancer Maternal Grandmother        Social History     Occupational History    Not on file   Tobacco Use    Smoking status: Never    Smokeless tobacco: Never   Vaping Use    Vaping status: Never Used   Substance and Sexual Activity    Alcohol use: Yes     Comment: social drinks    Drug use: Not Currently     Types: Marijuana    Sexual activity: Not on file         Current Outpatient Medications:     Ascorbic Acid 500 MG/5ML  LIQD, Take by mouth, Disp: , Rfl:     aspirin 81 mg chewable tablet, Chew 81 mg daily, Disp: , Rfl:     carvedilol (COREG) 25 mg tablet, Take 25 mg by mouth 2 (two) times a day with meals, Disp: , Rfl:     cholestyramine (QUESTRAN) 4 GM/DOSE powder, MIX 1 SCOOP IN 2 OUNCES OF WATER AND DRINK BY MOUTH EVERY DAY, Disp: , Rfl:     Cobalamin Combinations (Vitamin B12-Folic Acid) 500-400 MCG TABS, Take by mouth, Disp: , Rfl:     diazepam (VALIUM) 10 mg tablet, Take 10 mg by mouth every 6 (six) hours as needed for anxiety, Disp: , Rfl:     doxycycline hyclate (VIBRAMYCIN) 100 mg capsule, Take 1 capsule twice a day by oral route for 7 days., Disp: , Rfl:     losartan (COZAAR) 25 mg tablet, Take 25 mg by mouth 2 (two) times a day, Disp: , Rfl:     meloxicam (Mobic) 15 mg tablet, Take 1 tablet (15 mg total) by mouth daily, Disp: 30 tablet, Rfl: 0    primidone (MYSOLINE) 250 mg tablet, Take 500 mg by mouth 2 (two) times a day (Patient not taking: Reported on 5/17/2024), Disp: , Rfl:     primidone (MYSOLINE) 50 mg tablet, Take 50 mg by mouth 2 (two) times a day, Disp: , Rfl:     sacubitril-valsartan (Entresto) 49-51 MG TABS, , Disp: , Rfl:     Turmeric 500 MG CAPS, Take 500 mg by mouth daily, Disp: , Rfl:     Vitamin E 45 MG (100 UNIT) CAPS, Take 100 Units by mouth daily, Disp: , Rfl:     Allergies   Allergen Reactions    Amoxicillin Myalgia       Objective:  There were no vitals filed for this visit.  Pain Score:   7      Ortho Exam    Physical Exam  Constitutional:       General: He is not in acute distress.     Appearance: He is well-developed.   HENT:      Head: Normocephalic and atraumatic.   Eyes:      General: No scleral icterus.     Conjunctiva/sclera: Conjunctivae normal.   Neck:      Vascular: No JVD.   Cardiovascular:      Rate and Rhythm: Normal rate.   Pulmonary:      Effort: Pulmonary effort is normal. No respiratory distress.   Musculoskeletal:      Comments: As per HPI   Skin:     General: Skin is warm.  "  Neurological:      Mental Status: He is alert and oriented to person, place, and time.      Coordination: Coordination normal.       Large joint arthrocentesis: L knee  Universal Protocol:  Risks and benefits: risks, benefits and alternatives were discussed  Consent given by: patient  Time out: Immediately prior to procedure a \"time out\" was called to verify the correct patient, procedure, equipment, support staff and site/side marked as required.  Site marked: the operative site was marked  Supporting Documentation  Indications: pain   Procedure Details  Location: knee - L knee  Preparation: Patient was prepped and draped in the usual sterile fashion  Needle size: 22 G  Ultrasound guidance: no  Approach: anterolateral  Medications administered: 30 mg sodium hyaluronate 30 mg/2 mL  Specialty Pharmacy Supplied: received medications from pharmacy  Patient tolerance: patient tolerated the procedure well with no immediate complications  Dressing:  Sterile dressing applied      Large joint arthrocentesis: R knee  Universal Protocol:  Risks and benefits: risks, benefits and alternatives were discussed  Consent given by: patient  Time out: Immediately prior to procedure a \"time out\" was called to verify the correct patient, procedure, equipment, support staff and site/side marked as required.  Site marked: the operative site was marked  Supporting Documentation  Indications: pain   Procedure Details  Location: knee - R knee  Preparation: Patient was prepped and draped in the usual sterile fashion  Needle size: 22 G  Ultrasound guidance: no  Approach: anterolateral  Medications administered: 30 mg sodium hyaluronate 30 mg/2 mL  Specialty Pharmacy Supplied: received medications from pharmacy  Patient tolerance: patient tolerated the procedure well with no immediate complications  Dressing:  Sterile dressing applied              This document was created using speech voice recognition software.   Grammatical errors, random " word insertions, pronoun errors, and incomplete sentences are an occasional consequence of this system due to software limitations, ambient noise, and hardware issues.   Any formal questions or concerns about content, text, or information contained within the body of this dictation should be directly addressed to the provider for clarification.

## 2024-06-28 ENCOUNTER — TELEPHONE (OUTPATIENT)
Age: 74
End: 2024-06-28

## 2024-06-28 ENCOUNTER — TELEPHONE (OUTPATIENT)
Dept: OBGYN CLINIC | Facility: HOSPITAL | Age: 74
End: 2024-06-28

## 2024-06-28 NOTE — TELEPHONE ENCOUNTER
Caller: Mejia     Doctor: Chano / Ash Hernandez    Reason for call:  Had visco on 6/26 and today is having a lot of pain in his legs and was wondering if this was a side affect of the shots    Call back#: [phone]  - Please do not leave a message if no answer he can not retrieve them

## 2024-06-28 NOTE — TELEPHONE ENCOUNTER
Caller: Patient    Doctor: Chano    Reason for call: Patient had shots in both of his knees recently and he is experiencing some pain in his thighs and calves now and wondered if it is related to the shot. He asked if the PA could call him back. Thank you.    Call back#: [phone]

## 2024-07-01 NOTE — TELEPHONE ENCOUNTER
Caller: patient    Doctor: Chano    Reason for call: pt returning call from 6/28/24. He stated he is still having the pain in his upper thigh and calf    Call back#: [phone]

## 2024-07-01 NOTE — TELEPHONE ENCOUNTER
Returned pt's call and he did not want to go to UC/ED due to cost and long waits. Pain on Friday was worse and is better today 8/10. Taking Ibuprofen and aspercream w/lidocaine.  Still having B/L thigh and calf pain.  Advised rest, ice/heat, compress knees if helps for support, elevate, offload-safety, OTC pain meds.  Pt concerned that Nurse Practioner gave injection. Informed that JASON Hernandez is a PA and trained to do these injections and that they do not need to be done under fluro like hips. Pt would like to know what more he can do for this pain.  Please review and advise. Thank you.

## 2024-07-03 ENCOUNTER — PROCEDURE VISIT (OUTPATIENT)
Dept: OBGYN CLINIC | Facility: CLINIC | Age: 74
End: 2024-07-03
Payer: COMMERCIAL

## 2024-07-03 DIAGNOSIS — M17.0 PRIMARY OSTEOARTHRITIS OF BOTH KNEES: Primary | ICD-10-CM

## 2024-07-03 PROCEDURE — 20610 DRAIN/INJ JOINT/BURSA W/O US: CPT | Performed by: ORTHOPAEDIC SURGERY

## 2024-07-04 NOTE — PROGRESS NOTES
Assessment/Plan:  1. Primary osteoarthritis of both knees  Large joint arthrocentesis: L knee    Large joint arthrocentesis: R knee    sodium hyaluronate (ORTHOVISC) injection SOSY 30 mg    sodium hyaluronate (ORTHOVISC) injection SOSY 30 mg          The patient tolerated injections well. Post injection instructions provided. FU as needed for bilateral knees.     Subjective:   Mejia Baez is a 73 y.o. male who presents today for orthovisc #3 bilateral knees.       Review of Systems   Constitutional: Negative.  Negative for chills and fever.   HENT: Negative.  Negative for ear pain and sore throat.    Eyes: Negative.  Negative for pain and redness.   Respiratory: Negative.  Negative for shortness of breath and wheezing.    Cardiovascular:  Negative for chest pain and palpitations.   Gastrointestinal: Negative.  Negative for abdominal pain and blood in stool.   Endocrine: Negative.  Negative for polydipsia and polyuria.   Genitourinary: Negative.  Negative for difficulty urinating and dysuria.   Musculoskeletal:         As noted in HPI   Skin: Negative.  Negative for pallor and rash.   Neurological: Negative.  Negative for dizziness and numbness.   Hematological: Negative.  Negative for adenopathy. Does not bruise/bleed easily.   Psychiatric/Behavioral: Negative.  Negative for confusion and suicidal ideas.          Past Medical History:   Diagnosis Date   • Anxiety    • Depression    • Hypertension    • Pneumonia    • Urinary tract infection        Past Surgical History:   Procedure Laterality Date   • HERNIA REPAIR         Family History   Problem Relation Age of Onset   • Diabetes Mother    • Cancer Father    • Cancer Brother    • Breast cancer Maternal Grandmother    • Cancer Maternal Grandmother        Social History     Occupational History   • Not on file   Tobacco Use   • Smoking status: Never   • Smokeless tobacco: Never   Vaping Use   • Vaping status: Never Used   Substance and Sexual Activity   •  Alcohol use: Yes     Comment: social drinks   • Drug use: Not Currently     Types: Marijuana   • Sexual activity: Not on file         Current Outpatient Medications:   •  Ascorbic Acid 500 MG/5ML LIQD, Take by mouth, Disp: , Rfl:   •  aspirin 81 mg chewable tablet, Chew 81 mg daily, Disp: , Rfl:   •  carvedilol (COREG) 25 mg tablet, Take 25 mg by mouth 2 (two) times a day with meals, Disp: , Rfl:   •  cholestyramine (QUESTRAN) 4 GM/DOSE powder, MIX 1 SCOOP IN 2 OUNCES OF WATER AND DRINK BY MOUTH EVERY DAY, Disp: , Rfl:   •  Cobalamin Combinations (Vitamin B12-Folic Acid) 500-400 MCG TABS, Take by mouth, Disp: , Rfl:   •  diazepam (VALIUM) 10 mg tablet, Take 10 mg by mouth every 6 (six) hours as needed for anxiety, Disp: , Rfl:   •  doxycycline hyclate (VIBRAMYCIN) 100 mg capsule, Take 1 capsule twice a day by oral route for 7 days., Disp: , Rfl:   •  losartan (COZAAR) 25 mg tablet, Take 25 mg by mouth 2 (two) times a day, Disp: , Rfl:   •  meloxicam (Mobic) 15 mg tablet, Take 1 tablet (15 mg total) by mouth daily, Disp: 30 tablet, Rfl: 0  •  primidone (MYSOLINE) 250 mg tablet, Take 500 mg by mouth 2 (two) times a day (Patient not taking: Reported on 5/17/2024), Disp: , Rfl:   •  primidone (MYSOLINE) 50 mg tablet, Take 50 mg by mouth 2 (two) times a day, Disp: , Rfl:   •  sacubitril-valsartan (Entresto) 49-51 MG TABS, , Disp: , Rfl:   •  Turmeric 500 MG CAPS, Take 500 mg by mouth daily, Disp: , Rfl:   •  Vitamin E 45 MG (100 UNIT) CAPS, Take 100 Units by mouth daily, Disp: , Rfl:   No current facility-administered medications for this visit.    Allergies   Allergen Reactions   • Amoxicillin Myalgia       Objective:  There were no vitals filed for this visit.  Pain Score:   7      Ortho Exam    Physical Exam  Constitutional:       General: He is not in acute distress.     Appearance: He is well-developed.   HENT:      Head: Normocephalic and atraumatic.   Eyes:      General: No scleral icterus.     Conjunctiva/sclera:  "Conjunctivae normal.   Neck:      Vascular: No JVD.   Cardiovascular:      Rate and Rhythm: Normal rate.   Pulmonary:      Effort: Pulmonary effort is normal. No respiratory distress.   Musculoskeletal:      Comments: As per HPI   Skin:     General: Skin is warm.   Neurological:      Mental Status: He is alert and oriented to person, place, and time.      Coordination: Coordination normal.       Large joint arthrocentesis: L knee  Universal Protocol:  Risks and benefits: risks, benefits and alternatives were discussed  Consent given by: patient  Time out: Immediately prior to procedure a \"time out\" was called to verify the correct patient, procedure, equipment, support staff and site/side marked as required.  Site marked: the operative site was marked  Supporting Documentation  Indications: pain   Procedure Details  Location: knee - L knee  Preparation: Patient was prepped and draped in the usual sterile fashion  Needle size: 22 G  Ultrasound guidance: no  Approach: anterolateral  Medications administered: 30 mg sodium hyaluronate 30 mg/2 mL  Specialty Pharmacy Supplied: received medications from pharmacy  Patient tolerance: patient tolerated the procedure well with no immediate complications  Dressing:  Sterile dressing applied      Large joint arthrocentesis: R knee  Universal Protocol:  Risks and benefits: risks, benefits and alternatives were discussed  Consent given by: patient  Time out: Immediately prior to procedure a \"time out\" was called to verify the correct patient, procedure, equipment, support staff and site/side marked as required.  Site marked: the operative site was marked  Supporting Documentation  Indications: pain   Procedure Details  Location: knee - R knee  Preparation: Patient was prepped and draped in the usual sterile fashion  Needle size: 22 G  Ultrasound guidance: no  Approach: anterolateral  Medications administered: 30 mg sodium hyaluronate 30 mg/2 mL  Specialty Pharmacy Supplied: " received medications from pharmacy  Patient tolerance: patient tolerated the procedure well with no immediate complications  Dressing:  Sterile dressing applied              This document was created using speech voice recognition software.   Grammatical errors, random word insertions, pronoun errors, and incomplete sentences are an occasional consequence of this system due to software limitations, ambient noise, and hardware issues.   Any formal questions or concerns about content, text, or information contained within the body of this dictation should be directly addressed to the provider for clarification.

## 2024-11-21 ENCOUNTER — TELEPHONE (OUTPATIENT)
Age: 74
End: 2024-11-21

## 2024-11-21 NOTE — TELEPHONE ENCOUNTER
Caller: Steven (brother)     Doctor: Chano Burton    Reason for call: Calling to see what the next step will be for patient's knee pain. Was last in for infections in July. Explained he would need to schedule a f/u to discuss further option's with doctor.  He is going to relay that information to the patient to call and schedule a f/u no further questions       Call back#: [phone]

## 2024-12-06 ENCOUNTER — OFFICE VISIT (OUTPATIENT)
Age: 74
End: 2024-12-06
Payer: COMMERCIAL

## 2024-12-06 DIAGNOSIS — M17.0 PRIMARY OSTEOARTHRITIS OF BOTH KNEES: Primary | ICD-10-CM

## 2024-12-06 PROCEDURE — 99213 OFFICE O/P EST LOW 20 MIN: CPT | Performed by: ORTHOPAEDIC SURGERY

## 2024-12-06 NOTE — PROGRESS NOTES
Assessment/Plan:  1. Primary osteoarthritis of both knees          The patient has ongoing  pain about the knee despite extensive conservative treatment thus far. I am referring him to  to discuss possible total knee arthroplasty. I advised he again discuss his lower extremity edema with his PCP in the meantime.     Subjective:   Mejia Baez is a 74 y.o. male who presents today for follow-up of his bilateral knees. I have been treating him conservatively for arthritis of these knees. He has done corticosteroid injections, visco injections, OTC medications and knee exercises, without much improvements. He notes ongoing pain, which is limiting his daily activities at this point. The right knee is slightly worse than the left.       Review of Systems   Constitutional: Negative.  Negative for chills and fever.   HENT: Negative.  Negative for ear pain and sore throat.    Eyes: Negative.  Negative for pain and redness.   Respiratory: Negative.  Negative for shortness of breath and wheezing.    Cardiovascular:  Positive for leg swelling. Negative for chest pain and palpitations.   Gastrointestinal: Negative.  Negative for abdominal pain and blood in stool.   Endocrine: Negative.  Negative for polydipsia and polyuria.   Genitourinary: Negative.  Negative for difficulty urinating and dysuria.   Musculoskeletal:         As noted in HPI   Skin: Negative.  Negative for pallor and rash.   Neurological: Negative.  Negative for dizziness and numbness.   Hematological: Negative.  Negative for adenopathy. Does not bruise/bleed easily.   Psychiatric/Behavioral: Negative.  Negative for confusion and suicidal ideas.          Past Medical History:   Diagnosis Date    Anxiety     Depression     Hypertension     Pneumonia     Urinary tract infection        Past Surgical History:   Procedure Laterality Date    HERNIA REPAIR         Family History   Problem Relation Age of Onset    Diabetes Mother     Cancer Father     Cancer  Brother     Breast cancer Maternal Grandmother     Cancer Maternal Grandmother        Social History     Occupational History    Not on file   Tobacco Use    Smoking status: Never    Smokeless tobacco: Never   Vaping Use    Vaping status: Never Used   Substance and Sexual Activity    Alcohol use: Yes     Comment: social drinks    Drug use: Not Currently     Types: Marijuana    Sexual activity: Not on file         Current Outpatient Medications:     Ascorbic Acid 500 MG/5ML LIQD, Take by mouth, Disp: , Rfl:     aspirin 81 mg chewable tablet, Chew 81 mg daily, Disp: , Rfl:     carvedilol (COREG) 25 mg tablet, Take 25 mg by mouth 2 (two) times a day with meals, Disp: , Rfl:     cholestyramine (QUESTRAN) 4 GM/DOSE powder, MIX 1 SCOOP IN 2 OUNCES OF WATER AND DRINK BY MOUTH EVERY DAY, Disp: , Rfl:     Cobalamin Combinations (Vitamin B12-Folic Acid) 500-400 MCG TABS, Take by mouth, Disp: , Rfl:     diazepam (VALIUM) 10 mg tablet, Take 10 mg by mouth every 6 (six) hours as needed for anxiety, Disp: , Rfl:     doxycycline hyclate (VIBRAMYCIN) 100 mg capsule, Take 1 capsule twice a day by oral route for 7 days., Disp: , Rfl:     losartan (COZAAR) 25 mg tablet, Take 25 mg by mouth 2 (two) times a day, Disp: , Rfl:     meloxicam (Mobic) 15 mg tablet, Take 1 tablet (15 mg total) by mouth daily, Disp: 30 tablet, Rfl: 0    primidone (MYSOLINE) 250 mg tablet, Take 500 mg by mouth 2 (two) times a day (Patient not taking: Reported on 5/17/2024), Disp: , Rfl:     primidone (MYSOLINE) 50 mg tablet, Take 50 mg by mouth 2 (two) times a day, Disp: , Rfl:     sacubitril-valsartan (Entresto) 49-51 MG TABS, , Disp: , Rfl:     Turmeric 500 MG CAPS, Take 500 mg by mouth daily, Disp: , Rfl:     Vitamin E 45 MG (100 UNIT) CAPS, Take 100 Units by mouth daily, Disp: , Rfl:     Allergies   Allergen Reactions    Amoxicillin Myalgia       Objective:  There were no vitals filed for this visit.         Right Knee Exam     Tenderness   The patient is  experiencing tenderness in the medial joint line.    Range of Motion   Right knee extension: -3.   Flexion:  110     Tests   Varus: negative Valgus: positive  Drawer:  Anterior - negative    Posterior - negative    Other   Erythema: absent  Sensation: normal  Pulse: present  Swelling: none  Effusion: no effusion present    Comments:  2+ pitting edema pretibial to ankle. Chronic venous status skin changes.       Left Knee Exam     Tenderness   The patient is experiencing tenderness in the medial joint line.    Range of Motion   Left knee extension: -3.   Flexion:  110     Tests   Varus: negative Valgus: positive  Drawer:  Anterior - negative     Posterior - negative    Other   Erythema: absent  Sensation: normal  Pulse: present  Swelling: none  Effusion: no effusion present    Comments:  2+ pitting edema pretibial to ankle. Chronic venous stasis skin changes.          Observations   Left Knee   Negative for effusion.     Right Knee   Negative for effusion.       Physical Exam  Constitutional:       General: He is not in acute distress.     Appearance: He is well-developed.   HENT:      Head: Normocephalic and atraumatic.   Eyes:      General: No scleral icterus.     Conjunctiva/sclera: Conjunctivae normal.   Neck:      Vascular: No JVD.   Cardiovascular:      Rate and Rhythm: Normal rate.   Pulmonary:      Effort: Pulmonary effort is normal. No respiratory distress.   Musculoskeletal:      Right knee: No effusion.      Left knee: No effusion.      Comments: As per HPI   Skin:     General: Skin is warm.   Neurological:      Mental Status: He is alert and oriented to person, place, and time.      Coordination: Coordination normal.             This document was created using speech voice recognition software.   Grammatical errors, random word insertions, pronoun errors, and incomplete sentences are an occasional consequence of this system due to software limitations, ambient noise, and hardware issues.   Any formal  questions or concerns about content, text, or information contained within the body of this dictation should be directly addressed to the provider for clarification.

## 2024-12-18 ENCOUNTER — OFFICE VISIT (OUTPATIENT)
Dept: OBGYN CLINIC | Facility: CLINIC | Age: 74
End: 2024-12-18
Payer: COMMERCIAL

## 2024-12-18 ENCOUNTER — APPOINTMENT (OUTPATIENT)
Dept: RADIOLOGY | Facility: CLINIC | Age: 74
End: 2024-12-18
Payer: COMMERCIAL

## 2024-12-18 VITALS
BODY MASS INDEX: 37.8 KG/M2 | WEIGHT: 255.2 LBS | HEIGHT: 69 IN | DIASTOLIC BLOOD PRESSURE: 88 MMHG | SYSTOLIC BLOOD PRESSURE: 150 MMHG | HEART RATE: 77 BPM

## 2024-12-18 DIAGNOSIS — G89.29 CHRONIC PAIN OF BOTH KNEES: ICD-10-CM

## 2024-12-18 DIAGNOSIS — M25.561 CHRONIC PAIN OF BOTH KNEES: ICD-10-CM

## 2024-12-18 DIAGNOSIS — M17.11 PRIMARY OSTEOARTHRITIS OF RIGHT KNEE: Primary | ICD-10-CM

## 2024-12-18 DIAGNOSIS — M17.11 PRIMARY OSTEOARTHRITIS OF RIGHT KNEE: ICD-10-CM

## 2024-12-18 DIAGNOSIS — I87.2 CHRONIC VENOUS STASIS DERMATITIS OF BOTH LOWER EXTREMITIES: ICD-10-CM

## 2024-12-18 DIAGNOSIS — M17.0 PRIMARY OSTEOARTHRITIS OF BOTH KNEES: ICD-10-CM

## 2024-12-18 DIAGNOSIS — M25.562 CHRONIC PAIN OF BOTH KNEES: ICD-10-CM

## 2024-12-18 PROCEDURE — 73562 X-RAY EXAM OF KNEE 3: CPT

## 2024-12-18 PROCEDURE — 73560 X-RAY EXAM OF KNEE 1 OR 2: CPT

## 2024-12-18 PROCEDURE — 99215 OFFICE O/P EST HI 40 MIN: CPT | Performed by: ORTHOPAEDIC SURGERY

## 2024-12-18 RX ORDER — ZINC SULFATE 50(220)MG
220 CAPSULE ORAL DAILY
Qty: 30 CAPSULE | Refills: 0 | Status: SHIPPED | OUTPATIENT
Start: 2024-12-18 | End: 2025-01-17

## 2024-12-18 RX ORDER — CHLORHEXIDINE GLUCONATE 40 MG/ML
SOLUTION TOPICAL DAILY PRN
OUTPATIENT
Start: 2024-12-18

## 2024-12-18 RX ORDER — CEFAZOLIN SODIUM 2 G/50ML
2000 SOLUTION INTRAVENOUS ONCE
OUTPATIENT
Start: 2024-12-18 | End: 2024-12-18

## 2024-12-18 RX ORDER — TRANEXAMIC ACID 10 MG/ML
1000 INJECTION, SOLUTION INTRAVENOUS ONCE
OUTPATIENT
Start: 2024-12-18 | End: 2024-12-18

## 2024-12-18 RX ORDER — FERROUS SULFATE 324(65)MG
324 TABLET, DELAYED RELEASE (ENTERIC COATED) ORAL
Qty: 30 TABLET | Refills: 0 | Status: SHIPPED | OUTPATIENT
Start: 2024-12-18 | End: 2025-01-17

## 2024-12-18 RX ORDER — CHLORHEXIDINE GLUCONATE ORAL RINSE 1.2 MG/ML
15 SOLUTION DENTAL ONCE
OUTPATIENT
Start: 2024-12-18 | End: 2024-12-18

## 2024-12-18 RX ORDER — MUPIROCIN 20 MG/G
OINTMENT TOPICAL 2 TIMES DAILY
Qty: 22 G | Refills: 0 | Status: SHIPPED | OUTPATIENT
Start: 2024-12-18

## 2024-12-18 RX ORDER — FOLIC ACID 1 MG/1
1 TABLET ORAL DAILY
Qty: 30 TABLET | Refills: 0 | Status: SHIPPED | OUTPATIENT
Start: 2024-12-18 | End: 2025-01-17

## 2024-12-18 NOTE — PROGRESS NOTES
Assessment/Plan:  1. Primary osteoarthritis of right knee  Case request operating room: ARTHROPLASTY KNEE TOTAL W ROBOT - RIGHT, CEMENTED, OVERNIGHT    Comprehensive metabolic panel    Hemoglobin A1C W/EAG Estimation    CBC and differential    MRSA culture    Protime-INR    APTT    Amb[facility] Referral to [facility]    Ambulatory referral to Cardiology    Ambulatory referral to Physical Therapy    Ambulatory referral to Family Practice    EKG 12 lead    Case request operating room: ARTHROPLASTY KNEE TOTAL W ROBOT - RIGHT, CEMENTED, OVERNIGHT    Ambulatory Referral to Wound Care    XR knee 3 vw right non injury    XR knee 1 or 2 vw left    ascorbic acid (VITAMIN C) 500 MG TBCR    Cholecalciferol (VITAMIN D3) 1,000 units tablet    ferrous sulfate 324 (65 Fe) mg    zinc sulfate (ZINCATE) 220 mg capsule    folic acid (FOLVITE) 1 mg tablet    mupirocin (BACTROBAN) 2 % ointment    CANCELED: Type and screen    CANCELED: MRSA culture      2. Primary osteoarthritis of both knees  Ambulatory Referral to Orthopedic Surgery    XR knee 3 vw right non injury    XR knee 1 or 2 vw left      3. Chronic pain of both knees        4. Chronic venous stasis dermatitis of both lower extremities  Ambulatory Referral to Wound Care        Scribe Attestation      I,:  Jenny Alanis MD am acting as a scribe while in the presence of the attending physician.:       I,:  Basil Ballesteros, DO personally performed the services described in this documentation    as scribed in my presence.:           Based on the progression of his underlying disease and persistent pain despite nonoperative management including activity modification, maintenance of appropriate weight, OTC NSAID and analgesics, exercise program, and intra-articular injection therapy, I explained that he would benefit from right total knee arthroplasty. The pre marisela and postoperative expectations were discussed here in the office today. The risks and benefits of  undergoing right total knee arthroplasty were discussed at length and consents were signed and placed in the chart.  Please see risk discussion below. He denies a history of DVT/PE, MRSA infection, diabetes, malignancy, gi bleeding peptic ulcer, and smoking. He is not a smoker or excessive drinker and is not using turmeric. He understands he requires preoperative clearance from his  PCP and Cardiology. He is an excellent candidate for home physical therapy postoperatively. he will be placed on our overnight  discharge pathway. We will utilize cemented implants. he will meet with my surgery scheduler today to pick a date for his procedure and make preoperative arrangements.  All of his questions and concerns were addressed today.  We will see him back 1 month prior to his surgery to ensure progress and improvement to his lymphedema and multiple bilateral cat scratches to his legs. We discussed that his lymphedema and wounds places him at a significantly increased risk of infection.    Subjective: Initial evaluation for chronic bilateral knee pain    Patient ID: Mejia Baez is a 74 y.o. male presents for initial evaluation for chronic bilateral knee pain, R>L. His pain has been present for over a year and has progressively been worsening. It is located over his medial and lateral joint lines. He has been seeing Dr. Madden for his symptoms, he has received intra-articular corticosteroid injections and viscosupplementation. He has also modified his activities, all with no relief of his severe pain. He describes his pain at 10/10 in severity, and it has resulted in him limiting his overall mobility. He denies a history of diabetes, denies smoking cigarettes. He has a history of HTN and sleep apnea.     Review of Systems   Constitutional:  Positive for activity change. Negative for appetite change.   HENT:  Negative for congestion.    Eyes:  Negative for pain.   Respiratory:  Negative for apnea.    Cardiovascular:   Negative for chest pain.   Gastrointestinal:  Negative for abdominal pain.   Endocrine: Negative for cold intolerance and heat intolerance.   Genitourinary:  Negative for difficulty urinating.   Musculoskeletal:  Positive for arthralgias and gait problem.   Neurological:  Negative for dizziness and headaches.   Psychiatric/Behavioral:  Negative for agitation, behavioral problems and confusion.        Past Medical History:   Diagnosis Date    Anxiety     Depression     Hypertension     Pneumonia     Urinary tract infection        Past Surgical History:   Procedure Laterality Date    HERNIA REPAIR         Family History   Problem Relation Age of Onset    Diabetes Mother     Cancer Father     Cancer Brother     Breast cancer Maternal Grandmother     Cancer Maternal Grandmother        Social History     Occupational History    Not on file   Tobacco Use    Smoking status: Never    Smokeless tobacco: Never   Vaping Use    Vaping status: Never Used   Substance and Sexual Activity    Alcohol use: Yes     Comment: social drinks    Drug use: Not Currently     Types: Marijuana    Sexual activity: Not on file         Current Outpatient Medications:     ascorbic acid (VITAMIN C) 500 MG TBCR, Take 1 tablet (500 mg total) by mouth 2 (two) times a day, Disp: 60 tablet, Rfl: 0    Ascorbic Acid 500 MG/5ML LIQD, Take by mouth, Disp: , Rfl:     aspirin 81 mg chewable tablet, Chew 81 mg daily, Disp: , Rfl:     carvedilol (COREG) 25 mg tablet, Take 25 mg by mouth 2 (two) times a day with meals, Disp: , Rfl:     Cholecalciferol (VITAMIN D3) 1,000 units tablet, Take 1 tablet (1,000 Units total) by mouth daily, Disp: 30 tablet, Rfl: 0    cholestyramine (QUESTRAN) 4 GM/DOSE powder, MIX 1 SCOOP IN 2 OUNCES OF WATER AND DRINK BY MOUTH EVERY DAY, Disp: , Rfl:     Cobalamin Combinations (Vitamin B12-Folic Acid) 500-400 MCG TABS, Take by mouth, Disp: , Rfl:     diazepam (VALIUM) 10 mg tablet, Take 10 mg by mouth every 6 (six) hours as needed for  anxiety, Disp: , Rfl:     ferrous sulfate 324 (65 Fe) mg, Take 1 tablet (324 mg total) by mouth daily before breakfast, Disp: 30 tablet, Rfl: 0    folic acid (FOLVITE) 1 mg tablet, Take 1 tablet (1 mg total) by mouth daily, Disp: 30 tablet, Rfl: 0    mupirocin (BACTROBAN) 2 % ointment, Apply topically 2 (two) times a day Apply in each nares twice daily for five days before surgery., Disp: 22 g, Rfl: 0    primidone (MYSOLINE) 50 mg tablet, Take 50 mg by mouth 2 (two) times a day, Disp: , Rfl:     sacubitril-valsartan (Entresto) 49-51 MG TABS, , Disp: , Rfl:     Turmeric 500 MG CAPS, Take 500 mg by mouth daily, Disp: , Rfl:     Vitamin E 45 MG (100 UNIT) CAPS, Take 100 Units by mouth daily, Disp: , Rfl:     zinc sulfate (ZINCATE) 220 mg capsule, Take 1 capsule (220 mg total) by mouth daily, Disp: 30 capsule, Rfl: 0    doxycycline hyclate (VIBRAMYCIN) 100 mg capsule, Take 1 capsule twice a day by oral route for 7 days., Disp: , Rfl:     losartan (COZAAR) 25 mg tablet, Take 25 mg by mouth 2 (two) times a day, Disp: , Rfl:     meloxicam (Mobic) 15 mg tablet, Take 1 tablet (15 mg total) by mouth daily, Disp: 30 tablet, Rfl: 0    primidone (MYSOLINE) 250 mg tablet, Take 500 mg by mouth 2 (two) times a day (Patient not taking: Reported on 12/18/2024), Disp: , Rfl:     Allergies   Allergen Reactions    Amoxicillin Myalgia       Objective:  Vitals:    12/18/24 1342   BP: 150/88   Pulse: 77       Body mass index is 37.69 kg/m².    Right Knee Exam     Tenderness   The patient is experiencing tenderness in the lateral joint line and medial joint line.    Range of Motion   Extension:  0   Flexion:  100     Tests   Simone:  Medial - negative Lateral - negative  Varus: negative Valgus: negative  Lachman:  Anterior - negative    Posterior - negative  Drawer:  Anterior - negative        Other   Sensation: normal  Pulse: present  Effusion: no effusion present    Comments:  Lymphedema with chronic venous stasis dermatitis  Multiple  scabs in varying states of healing       Left Knee Exam     Tenderness   The patient is experiencing tenderness in the medial joint line and lateral joint line.    Range of Motion   Extension:  0   Flexion:  100     Tests   Simone:  Medial - negative Lateral - negative  Varus: negative Valgus: negative  Lachman:  Anterior - negative    Posterior - negative  Drawer:  Anterior - negative     Posterior - negative    Other   Sensation: normal  Pulse: present  Effusion: no effusion present    Comments:  Lymphedema with chronic venous stasis dermatitis  Multiple scabs in varying states of healing           Observations   Left Knee   Negative for effusion.     Right Knee   Negative for effusion.       Physical Exam  Vitals and nursing note reviewed.   Constitutional:       Appearance: Normal appearance.   HENT:      Right Ear: External ear normal.      Left Ear: External ear normal.      Nose: Nose normal.      Mouth/Throat:      Mouth: Mucous membranes are moist.   Eyes:      Extraocular Movements: Extraocular movements intact.      Conjunctiva/sclera: Conjunctivae normal.   Cardiovascular:      Rate and Rhythm: Normal rate.      Pulses: Normal pulses.   Pulmonary:      Effort: Pulmonary effort is normal.   Musculoskeletal:      Right knee: No effusion.      Instability Tests: Medial Simone test negative and lateral Simone test negative.      Left knee: No effusion.      Instability Tests: Medial Simone test negative and lateral Simone test negative.   Skin:     General: Skin is warm.      Capillary Refill: Capillary refill takes less than 2 seconds.   Neurological:      General: No focal deficit present.      Mental Status: He is alert and oriented to person, place, and time.   Psychiatric:         Mood and Affect: Mood normal.         Behavior: Behavior normal.         Thought Content: Thought content normal.       I have personally reviewed pertinent films in PACS.    XR Right Knee - Severe tricompartmental  degenerative changes, most prominent to medial compartment, with bone on bone contact, subchondral sclerosis, and peripheral osteophyte formation  XR Left Knee - Severe tricompartmental degenerative changes, most prominent to medial compartment, with bone on bone contact, subchondral sclerosis, and peripheral osteophyte formation    The patient was counseled in detail regarding the diagnosis, the treatment options available, the prognosis of each treatment option, the potential risks and complications.  These are, but are not limited to; deep vein thrombosis, pulmonary embolism, neurologic and vascular injury, infection, instability, leg length discrepancy, dislocation, hematoma, reflex sympathetic dystrophy, loss of range of motion, ankylosis of the knee, fracture, screw or prosthetic perforation, chronic pain, acute pain, chronic leg pain and edema, loosening, death, heart attack, and stroke.  The patient's questions were answered in detail.  The patient demonstrates understanding of these risks and wishes to proceed with surgery.      This document was created using speech voice recognition software.   Grammatical errors, random word insertions, pronoun errors, and incomplete sentences are an occasional consequence of this system due to software limitations, ambient noise, and hardware issues.   Any formal questions or concerns about content, text, or information contained within the body of this dictation should be directly addressed to the provider for clarification.

## 2024-12-19 ENCOUNTER — TELEPHONE (OUTPATIENT)
Dept: OBGYN CLINIC | Facility: CLINIC | Age: 74
End: 2024-12-19

## 2024-12-19 NOTE — TELEPHONE ENCOUNTER
Pt called in to inform Dr. Ballesteros that he has   Sleep apnea and would like to know if it will interfere with his surgery in any way?  He saw his pulmonologist in May and received a sleep apnea machine.    Please advise

## 2025-01-16 ENCOUNTER — OFFICE VISIT (OUTPATIENT)
Dept: WOUND CARE | Facility: HOSPITAL | Age: 75
End: 2025-01-16
Payer: COMMERCIAL

## 2025-01-16 VITALS
RESPIRATION RATE: 16 BRPM | BODY MASS INDEX: 37.03 KG/M2 | WEIGHT: 250 LBS | SYSTOLIC BLOOD PRESSURE: 155 MMHG | DIASTOLIC BLOOD PRESSURE: 88 MMHG | HEART RATE: 81 BPM | HEIGHT: 69 IN

## 2025-01-16 DIAGNOSIS — I87.2 CHRONIC VENOUS INSUFFICIENCY: Primary | ICD-10-CM

## 2025-01-16 PROCEDURE — 99214 OFFICE O/P EST MOD 30 MIN: CPT | Performed by: FAMILY MEDICINE

## 2025-01-16 PROCEDURE — 99212 OFFICE O/P EST SF 10 MIN: CPT | Performed by: FAMILY MEDICINE

## 2025-01-16 RX ORDER — AMITRIPTYLINE HYDROCHLORIDE 75 MG/1
75 TABLET ORAL
COMMUNITY
Start: 2024-12-04

## 2025-01-16 NOTE — PATIENT INSTRUCTIONS
You currently have no wounds.  You have swelling of your lower legs which puts you at high risk to develop wounds.  Moisturize your legs and feet every day.  (Do not put moisturizer between your toes)  We recommend compression socks to control your swelling. Your compression socks should be 10-20 mm Hg.  We applied Spandagrip G compression to your legs today. Spandagrip can be ordered on [employer] if you prefer the spandagrip rather than compression socks. If  choose to wear Spandagrip, wear if from the base of your toes to just below your knee.  Put on first thing in the AM; you may remove them at bedtime.   When you are in bed, place a pillow under your legs to elevate them.  Elevating your legs will help decrease swelling.

## 2025-01-16 NOTE — PROGRESS NOTES
Name: Mejia Baez      : 1950      MRN: [id number]  Encounter Provider: Tammy Terrell DO  Encounter Date: 2025   Encounter department: [facility] WOUND CARE  :  Assessment & Plan  Chronic venous insufficiency  No open wounds at this time but extensively discussed the importance of edema reduction via compression and elevation in order to minimize the risks of wounds developing.  Explained that if patient were to proceed with surgery while having wounds he would be at a very high risk of infection in the joint and therefore orthopedics is not likely to move forward with the surgery if in fact he does have wounds.  In order to minimize the chance of him developing wounds prior to his surgery date which is 2025, I strongly recommend he start wearing compression daily.  Explained that his daily routine should consist of moisturizing his lower extremities and applying the compression stockings.  Tubigrip's placed today.  Explained that patient can obtain compression stockings, 10 to 20 mmHg, should be sufficient or he can obtain a roll of the Tubigrip's and cut a new piece every 1 to 2 weeks.  All compression should be worn from toes to knee.  Patient should get in the habit of keeping his legs elevated whenever he is seated and avoid prolonged standing.  Follow-up here in the wound management center as needed.           History of Present Illness   Chief Complaint   Patient presents with   • No Wound Consult   Patient presents for evaluation of bilateral lower extremity wounds.  Patient was seen by orthopedics, Dr. Ballesteros, a month ago at which time patient was noted to have multiple cat scratches on his bilateral lower extremities.  Dr. Ballesteros scheduled the patient for a total knee replacement on 2025 and referred him here to the wound management center to ensure that his wounds are healed prior to this date.  Patient reports that he has no significant open areas at  "this point.  He has been applying moisturizer to his lower extremities but he typically does not wear any compression.  He states he has tried wearing compression stockings in the past but has extreme difficulty getting them off and on and thus does not wear any.  No diabetes.  No smoking.      Here for wound follow up.    Objective   /88   Pulse 81   Resp 16   Ht 5' 9\" (1.753 m)   Wt 113 kg (250 lb)   BMI 36.92 kg/m²     Physical Exam  Constitutional:       General: He is not in acute distress.     Appearance: Normal appearance. He is obese. He is not ill-appearing, toxic-appearing or diaphoretic.   HENT:      Head: Normocephalic and atraumatic.      Right Ear: External ear normal.      Left Ear: External ear normal.   Eyes:      Conjunctiva/sclera: Conjunctivae normal.   Cardiovascular:      Pulses:           Dorsalis pedis pulses are 2+ on the right side and 2+ on the left side.        Posterior tibial pulses are 2+ on the right side and 2+ on the left side.   Pulmonary:      Effort: Pulmonary effort is normal. No respiratory distress.   Musculoskeletal:      Cervical back: Neck supple.      Right lower leg: Edema present.      Left lower leg: Edema present.   Skin:     Comments: No open wounds bilateral lower extremities but both noted to have significant venous stasis changes   Neurological:      Mental Status: He is alert.   Psychiatric:         Mood and Affect: Mood normal.         Behavior: Behavior normal.            Procedures         Administrative Statements   I have spent a total time of 35 minutes in caring for this patient on the day of the visit/encounter including Instructions for management, Risk factor reductions, Impressions, and Documenting in the medical record.   "

## 2025-01-20 ENCOUNTER — TELEPHONE (OUTPATIENT)
Dept: OBGYN CLINIC | Facility: HOSPITAL | Age: 75
End: 2025-01-20

## 2025-01-20 DIAGNOSIS — M17.0 PRIMARY OSTEOARTHRITIS OF BOTH KNEES: Primary | ICD-10-CM

## 2025-01-22 LAB
DME PARACHUTE DELIVERY DATE REQUESTED: NORMAL
DME PARACHUTE ITEM DESCRIPTION: NORMAL
DME PARACHUTE ORDER STATUS: NORMAL
DME PARACHUTE SUPPLIER NAME: NORMAL
DME PARACHUTE SUPPLIER PHONE: NORMAL

## 2025-01-22 NOTE — TELEPHONE ENCOUNTER
Preoperative Elective Admission Assessment: spoke to patient.     EKG/LAB/MRSA SWAB/CXR date: 1/27     Living Situation:    Who does pt live with: lives alone  What kind of home: multi-level  How do they enter the home: front  How many levels in home: 2 level home, but resides first floor currently.    # of steps to enter home: 0 to enter  # of steps to second floor: N/A   Sleeping arrangement: first/entry floor  Where is Bathroom: First floor bath, step in tub with seat and bars      First Floor Setup:   Is there a bathroom: Yes  Where would pt sleep: bed     DME: Rollator and cane. Ordering RW.   Instructed to bring Standard RW.     We discussed clearing pathways in the home and making sure there is accessibly to use the walker, for example, removing throw rugs.      Patient's Current Level of Function: Ambulates with cane and ADLs: Independent    Post-op Caregiver: relative, Brother- coming to stay with patient postop.   Currently receive any HHC/aides/community supports: No     Post-op Transport: Brother does not drive.     Outpatient Physical Therapy Site:  Site: [facility]- recommended 2/27 or 2/28 appt   pre and post-op appts scheduled? No     Medication Management: self  Preferred Pharmacy for Post-op Medications: [id number] [place] [facility] [address] [[id number]]   Blood Management Vitamin Regimen: Pt confirms taking as prescribed  Post-op anticoagulant: to be determined by surgical team postoperatively  Has Bactroban for 5 days preop: Yes  Educated on Preoperative Bathing Instructions, and use of Soap for 5 days before surgery.      DC Plan: Pt plans to be discharged home    Barriers to DC identified preoperatively: transportation    BMI: 36.92    Patient Education:  Pt educated on post-op pain, early mobilization (POD0), LOS goals, OP PT goals, and preoperative bathing. Patient educated that our goal is to appropriately discharge patient based off their post-op function while striving to maintain  maximal independence. The goal is to discharge patient to home and for them to attend outpatient physical therapy.    Assigned to care team? Yes

## 2025-01-22 NOTE — TELEPHONE ENCOUNTER
Caller:pt     Call back#: Phone:   [phone]     Best call back time am/pm/all day: pm (late morning or pm)    Doctor: Favian     Surgery: no    Date of Surgery: 2/24    Reason for call: returning call       Urgent matters - Please Teams message Nurse Navigator Team Chat & send phone note to Orthopedic Nurse Navigator Pool as high priority before transferring call.   Non-Urgent matters - Please send a phone note to Orthopedic Nurse Navigator Pool only. Nurse Navigators will call patients back asap.

## 2025-01-23 ENCOUNTER — PATIENT OUTREACH (OUTPATIENT)
Dept: OBGYN CLINIC | Facility: HOSPITAL | Age: 75
End: 2025-01-23

## 2025-01-23 ENCOUNTER — OFFICE VISIT (OUTPATIENT)
Dept: OBGYN CLINIC | Facility: CLINIC | Age: 75
End: 2025-01-23
Payer: COMMERCIAL

## 2025-01-23 VITALS — HEIGHT: 69 IN | BODY MASS INDEX: 37.03 KG/M2 | WEIGHT: 250 LBS

## 2025-01-23 DIAGNOSIS — M25.561 CHRONIC PAIN OF RIGHT KNEE: ICD-10-CM

## 2025-01-23 DIAGNOSIS — R15.9 BOWEL AND BLADDER INCONTINENCE: ICD-10-CM

## 2025-01-23 DIAGNOSIS — K14.8 LESION OF TONGUE: ICD-10-CM

## 2025-01-23 DIAGNOSIS — B35.1 ONYCHOMYCOSIS: ICD-10-CM

## 2025-01-23 DIAGNOSIS — M17.11 PRIMARY OSTEOARTHRITIS OF RIGHT KNEE: Primary | ICD-10-CM

## 2025-01-23 DIAGNOSIS — Z01.818 PREOPERATIVE EXAMINATION: ICD-10-CM

## 2025-01-23 DIAGNOSIS — G89.29 CHRONIC PAIN OF RIGHT KNEE: ICD-10-CM

## 2025-01-23 DIAGNOSIS — R32 BOWEL AND BLADDER INCONTINENCE: ICD-10-CM

## 2025-01-23 PROCEDURE — 99214 OFFICE O/P EST MOD 30 MIN: CPT | Performed by: ORTHOPAEDIC SURGERY

## 2025-01-23 RX ORDER — CLINDAMYCIN HYDROCHLORIDE 300 MG/1
CAPSULE ORAL
COMMUNITY
Start: 2025-01-22

## 2025-01-23 NOTE — PROGRESS NOTES
Assessment/Plan:  1. Primary osteoarthritis of right knee  Ambulatory referral to Spine & Pain Management      2. Chronic pain of right knee  Ambulatory referral to Spine & Pain Management      3. Onychomycosis  Ambulatory Referral to Podiatry      4. Bowel and bladder incontinence        5. Lesion of tongue        6. Preoperative examination          Scribe Attestation      I,:  Manisha Lr am acting as a scribe while in the presence of the attending physician.:       I,:  Basil Ballesteros, DO personally performed the services described in this documentation    as scribed in my presence.:           Mejia is a pleasant 74-year-old male who presents for preoperative skin check of the right lower extremity today.  Unfortunately upon exam, there were multiple wounds and abrasions of the right upper thigh that appear to be cat scratches.  He did also present with onychomycosis of multiple toenails on the right foot, as well as small wounds within his lymphedema.  Due to this, his right total knee arthroplasty scheduled for 2/24/2024 will need to be canceled.  At this time, I do not believe he is an appropriate surgical candidate given his new onset urinary and bowel incontinence.  I expressed the importance of following this closely with his primary care physician or urologist.  A referral was provided to him for podiatry to take care of the onychomycosis and associated condition of his toes.  He should continue to seek care from his oral surgeon regarding the tongue lesion.  I stressed the importance of addressing all of the above issues prior to pursuing an elective orthopedic surgery.  In addition to his multiple health issues, we discussed that a lack of transportation to physical therapy and postoperative visits following surgery is likely to result in a negative outcome.  His insurance will not cover transportation and his family who was to assist post op does not drive also.  Due to the severity of his  osteoarthritis and pain, I have provided him a referral to spine pain management for evaluation and treatment well he waits to undergo surgery.  He expresses understanding of this today.  He will follow-up on an as needed basis moving forward    Subjective: preoperative skin check    Patient ID: Mejia Baez is a 74 y.o. male who presents for a preoperative skin check of the RLE. He presents using a cane for ambulatory assistance. He is scheduled to undergo a right total knee arthroplasty on 2/24/2025. He states his brother was planning on coming to help him after surgery; however, his brother does not drive. Unfortunately, his insurance does not provide transportation benefits. He states his lymphedema seems to be improving. He is currently seeing wound care for this. He does report owning 4 cats at home. They tend to jump on him, and their claws occasionally catch on his skin. Since his last visit, he has begun experiencing intermittent bowel and bladder incontinence that his PCP is following. He also noticed a lesion under his tongue that he is seeing an oral surgeon for.    Review of Systems   Constitutional:  Positive for activity change. Negative for chills and fever.   HENT:  Positive for mouth sores. Negative for ear pain and sore throat.    Eyes:  Negative for pain and visual disturbance.   Respiratory:  Negative for cough and shortness of breath.    Cardiovascular:  Negative for chest pain and palpitations.   Gastrointestinal:  Negative for abdominal pain and vomiting.   Genitourinary:  Positive for frequency and urgency. Negative for dysuria and hematuria.   Musculoskeletal:  Positive for arthralgias. Negative for back pain.   Skin:  Positive for wound. Negative for color change and rash.   Neurological:  Negative for seizures and syncope.   All other systems reviewed and are negative.        Past Medical History:   Diagnosis Date    Anxiety     Depression     Hypertension     Pneumonia     Urinary  tract infection        Past Surgical History:   Procedure Laterality Date    HERNIA REPAIR         Family History   Problem Relation Age of Onset    Diabetes Mother     Cancer Father     Cancer Brother     Breast cancer Maternal Grandmother     Cancer Maternal Grandmother        Social History     Occupational History    Not on file   Tobacco Use    Smoking status: Never    Smokeless tobacco: Never   Vaping Use    Vaping status: Never Used   Substance and Sexual Activity    Alcohol use: Yes     Comment: 3-4x/week vodka    Drug use: Not Currently     Types: Marijuana    Sexual activity: Not on file         Current Outpatient Medications:     amitriptyline (ELAVIL) 75 mg tablet, Take 75 mg by mouth daily at bedtime, Disp: , Rfl:     Ascorbic Acid 500 MG/5ML LIQD, Take by mouth, Disp: , Rfl:     aspirin 81 mg chewable tablet, Chew 81 mg daily, Disp: , Rfl:     carvedilol (COREG) 25 mg tablet, Take 25 mg by mouth 2 (two) times a day with meals, Disp: , Rfl:     cholestyramine (QUESTRAN) 4 GM/DOSE powder, MIX 1 SCOOP IN 2 OUNCES OF WATER AND DRINK BY MOUTH EVERY DAY, Disp: , Rfl:     clindamycin (CLEOCIN) 300 MG capsule, , Disp: , Rfl:     Cobalamin Combinations (Vitamin B12-Folic Acid) 500-400 MCG TABS, Take by mouth, Disp: , Rfl:     diazepam (VALIUM) 10 mg tablet, Take 10 mg by mouth every 6 (six) hours as needed for anxiety, Disp: , Rfl:     primidone (MYSOLINE) 50 mg tablet, Take 50 mg by mouth 2 (two) times a day, Disp: , Rfl:     sacubitril-valsartan (Entresto) 49-51 MG TABS, , Disp: , Rfl:     Vitamin E 45 MG (100 UNIT) CAPS, Take 100 Units by mouth daily, Disp: , Rfl:     ascorbic acid (VITAMIN C) 500 MG TBCR, Take 1 tablet (500 mg total) by mouth 2 (two) times a day, Disp: 60 tablet, Rfl: 0    Cholecalciferol (VITAMIN D3) 1,000 units tablet, Take 1 tablet (1,000 Units total) by mouth daily, Disp: 30 tablet, Rfl: 0    doxycycline hyclate (VIBRAMYCIN) 100 mg capsule, Take 1 capsule twice a day by oral route for 7  days., Disp: , Rfl:     ferrous sulfate 324 (65 Fe) mg, Take 1 tablet (324 mg total) by mouth daily before breakfast (Patient not taking: Reported on 1/16/2025), Disp: 30 tablet, Rfl: 0    folic acid (FOLVITE) 1 mg tablet, Take 1 tablet (1 mg total) by mouth daily, Disp: 30 tablet, Rfl: 0    losartan (COZAAR) 25 mg tablet, Take 25 mg by mouth 2 (two) times a day, Disp: , Rfl:     meloxicam (Mobic) 15 mg tablet, Take 1 tablet (15 mg total) by mouth daily, Disp: 30 tablet, Rfl: 0    mupirocin (BACTROBAN) 2 % ointment, Apply topically 2 (two) times a day Apply in each nares twice daily for five days before surgery. (Patient not taking: Reported on 1/16/2025), Disp: 22 g, Rfl: 0    primidone (MYSOLINE) 250 mg tablet, Take 500 mg by mouth 2 (two) times a day (Patient not taking: Reported on 1/23/2025), Disp: , Rfl:     Turmeric 500 MG CAPS, Take 500 mg by mouth daily (Patient not taking: Reported on 1/23/2025), Disp: , Rfl:     zinc sulfate (ZINCATE) 220 mg capsule, Take 1 capsule (220 mg total) by mouth daily, Disp: 30 capsule, Rfl: 0    Allergies   Allergen Reactions    Amoxicillin Myalgia       Objective:  There were no vitals filed for this visit.    Body mass index is 36.92 kg/m².    Right Knee Exam     Tenderness   The patient is experiencing tenderness in the lateral joint line and medial joint line.    Range of Motion   Extension:  0   Flexion:  100     Tests   Simone:  Medial - negative Lateral - negative  Varus: negative Valgus: negative  Lachman:  Anterior - negative    Posterior - negative  Drawer:  Anterior - negative        Other   Erythema: present (RLE)  Scars: absent  Sensation: normal  Pulse: present  Swelling: moderate  Effusion: no effusion present    Comments:  Lymphedema with chronic venous stasis dermatitis  Multiple scabs in varying states of healing on upper right thigh  Onychomycosis of multiple toe nails          Observations     Right Knee   Negative for effusion.       Physical Exam  Vitals  and nursing note reviewed.   Constitutional:       Appearance: Normal appearance.   HENT:      Head: Normocephalic and atraumatic.      Right Ear: External ear normal.      Left Ear: External ear normal.      Nose: Nose normal.   Eyes:      General: No scleral icterus.     Extraocular Movements: Extraocular movements intact.      Conjunctiva/sclera: Conjunctivae normal.   Cardiovascular:      Rate and Rhythm: Normal rate.   Pulmonary:      Effort: Pulmonary effort is normal. No respiratory distress.   Musculoskeletal:         General: Tenderness present.      Cervical back: Normal range of motion and neck supple.      Right knee: No effusion.      Instability Tests: Medial Simone test negative and lateral Simone test negative.      Right lower leg: Edema present.      Comments: See ortho exam   Skin:     General: Skin is warm and dry.   Neurological:      Mental Status: He is alert and oriented to person, place, and time.   Psychiatric:         Mood and Affect: Mood normal.         Behavior: Behavior normal.       This document was created using speech voice recognition software.   Grammatical errors, random word insertions, pronoun errors, and incomplete sentences are an occasional consequence of this system due to software limitations, ambient noise, and hardware issues.   Any formal questions or concerns about content, text, or information contained within the body of this dictation should be directly addressed to the provider for clarification.

## 2025-01-23 NOTE — PROGRESS NOTES
[facility] received a new referral in regard to pt scheduled for arthroplasty of right knee on 02/24/2025. [facility] reviewed [facility] notes prior to contacting pt. Pt lives alone in a multi level home. Pt resides on first floor currently. Pt ambulates with a cane and is independent with ADLs. Pts post op caregiver support person will be his brother who is coming to stay with pt after surgery. However, pt brother does not drive. Pt has no transportation to and from surgery and to and from OP PT.   Prior to contacting pt, [facility] contacted pt insurance [facility] [employer] @ [phone], reference # for call is [id number]. I was advised pt does not have any transportation benefits. Pt will need to seek out transportation through [facility].   [facility] attempted to reach pt today and was unable. A message was left to please return [facility] call. [facility] also left the phone number for [facility] [employer] [facility] @ [phone] to contact in regard to transportation. [facility] will remain available.

## 2025-01-24 ENCOUNTER — PATIENT OUTREACH (OUTPATIENT)
Dept: OBGYN CLINIC | Facility: HOSPITAL | Age: 75
End: 2025-01-24

## 2025-01-24 NOTE — PROGRESS NOTES
PJ received a message that pt arthroplasty of right knee was cancelled due to several concerns. Pt had left a message returning my call from yesterday. PJ contacted pt today and introduced self and role. Pt shared that there were several medical concerns that he needs to address prior to surgery being rescheduled. Pt reports no transportation concerns at baseline because he does drive and have a car. [facility] will remain available to support pt with any future needs and concerns.

## 2025-02-10 ENCOUNTER — TELEPHONE (OUTPATIENT)
Age: 75
End: 2025-02-10

## 2025-02-10 ENCOUNTER — OFFICE VISIT (OUTPATIENT)
Dept: PAIN MEDICINE | Facility: CLINIC | Age: 75
End: 2025-02-10
Payer: COMMERCIAL

## 2025-02-10 DIAGNOSIS — M17.11 PRIMARY OSTEOARTHRITIS OF RIGHT KNEE: ICD-10-CM

## 2025-02-10 DIAGNOSIS — G89.29 CHRONIC PAIN OF RIGHT KNEE: ICD-10-CM

## 2025-02-10 DIAGNOSIS — M25.561 CHRONIC PAIN OF RIGHT KNEE: ICD-10-CM

## 2025-02-10 PROCEDURE — G2211 COMPLEX E/M VISIT ADD ON: HCPCS | Performed by: STUDENT IN AN ORGANIZED HEALTH CARE EDUCATION/TRAINING PROGRAM

## 2025-02-10 PROCEDURE — 99204 OFFICE O/P NEW MOD 45 MIN: CPT | Performed by: STUDENT IN AN ORGANIZED HEALTH CARE EDUCATION/TRAINING PROGRAM

## 2025-02-10 RX ORDER — DULOXETIN HYDROCHLORIDE 20 MG/1
20 CAPSULE, DELAYED RELEASE ORAL DAILY
Qty: 30 CAPSULE | Refills: 0 | Status: SHIPPED | OUTPATIENT
Start: 2025-02-10 | End: 2025-03-12

## 2025-02-10 NOTE — PATIENT INSTRUCTIONS
-To take Tylenol arthritis 1000 mg every 8 hours  -Start duloxetine 20 mg daily   -Can continue with topical ointments as needed  -Follow up in four weeks

## 2025-02-10 NOTE — TELEPHONE ENCOUNTER
Caller: [facility]    Doctor: Dr. Melchor    Reason for call: pharmacy called stating the duloxetine that she is getting medication reactions for two of the pt's other medications (carvedilol & amitriptyline).  Is it ok for them to still dispense the duloxetine?    Call back#: [phone]

## 2025-02-10 NOTE — PROGRESS NOTES
Assessment:  1. Primary osteoarthritis of right knee    2. Chronic pain of right knee    Patient is a 74-year-old male who presents as a new patient visit after being referred by Dr. Ballesteros for bilateral knee pain.  Patient was scheduled for a total knee arthroplasty in February but unfortunately was found to have multiple wounds and abrasions in his right upper thigh and onychomycosis of multiple toenails.  Patient here today to establish and for consideration of medication management.  Over the past month the intensity of pains been severe and he rates pain currently as a 9 out of 10 on milligram scale.  The pain occurs constantly.  He describes pain as cutting, dull aching, sharp with some associated weakness in lower extremities.  He does use a cane.  Activities increases pain include prayer, standing, bending, walking.  Patient with x-ray of his right knee with severe OA.  Patient had Euflexxa injections and no surgeries.  Past medical history symptom for anxiety, depression, hypertension, arthritis.  Prior pain treatments include physical therapy provided no relief.  He uses alcohol daily.  Prior medications include tramadol which helped along with Aspercreme.  He is currently using ibuprofen and has used Mobic and naproxen in the past with no benefit.  He has not trialed any muscle relaxants or Nerve pain medications.  Of note he is on amitriptyline 75 mg nightly.     On further discussion with patient he states he is prescribed amitriptyline to take for sleep and he only takes this intermittently.  Discussed with patient for his osteoarthritis pain given he has failed multiple NSAIDs, the neck step would be to trial duloxetine.  Will start at 20 mg daily.  Also discussed with patient that he should increase his Tylenol to 1 g 3 times daily as needed.  Lastly discussed with patient that he can continue with topical ointments and creams for his bilateral knees.  Discussed with patient if he receives no benefit  from duloxetine therapy, neck step would be to trial tramadol.  Patient amenable to this plan.    Plan:  Start duloxetine 20 mg daily  Increase apap to 1g TID prn   Continue with HEP   Continue use of topical anti-inflammatories   Follow up in four weeks for med management  No orders of the defined types were placed in this encounter.      New Medications Ordered This Visit   Medications   • DULoxetine (CYMBALTA) 20 mg capsule     Sig: Take 1 capsule (20 mg total) by mouth daily     Dispense:  30 capsule     Refill:  0       My impressions and treatment recommendations were discussed in detail with the patient, who verbalized understanding and had no further questions.    Complete risks and benefits including bleeding, infection, tissue reaction, nerve injury and allergic reaction were discussed. The approach was demonstrated using models and literature was provided. Verbal and written consent was obtained.    Follow-up is planned in four weeks time or sooner as warranted.  Discharge instructions were provided. I personally saw and examined the patient and I agree with the above discussed plan of care.    History of Present Illness:    Mejia Baez is a 74 y.o. male who presents to [facility] for initial evaluation of the above stated pain complaints. The patient has a past medical and chronic pain history as outlined in the assessment section. He was referred by Basil Ballesteros, DO  [address]  [address], [address]  [place] [address].    Patient is a 74-year-old male who presents as a new patient visit after being referred by Dr. Ballesteros for bilateral knee pain.  Patient was scheduled for a total knee arthroplasty in February but unfortunately was found to have multiple wounds and abrasions in his right upper thigh and onychomycosis of multiple toenails.  Patient here today to establish and for consideration of medication management.  Over the past month the  intensity of pains been severe and he rates pain currently as a 9 out of 10 on milligram scale.  The pain occurs constantly.  He describes pain as cutting, dull aching, sharp with some associated weakness in lower extremities.  He does use a cane.  Activities increases pain include prayer, standing, bending, walking.  Patient with x-ray of his right knee with severe OA.  Patient had Euflexxa injections and no surgeries.  Past medical history symptom for anxiety, depression, hypertension, arthritis.  Prior pain treatments include physical therapy provided no relief.  He uses alcohol daily.  Prior medications include tramadol which helped along with Aspercreme.  He is currently using ibuprofen and has used Mobic and naproxen in the past with no benefit.  He has not trialed any muscle relaxants or Nerve pain medications.  Of note he is on amitriptyline 75 mg nightly.     Review of Systems:    Review of Systems   Constitutional:  Negative for chills and fatigue.   HENT:  Negative for ear pain, mouth sores and sinus pressure.    Eyes:  Negative for pain, redness and visual disturbance.   Respiratory:  Positive for shortness of breath. Negative for wheezing.    Cardiovascular:  Negative for chest pain and palpitations.   Gastrointestinal:  Negative for abdominal pain and nausea.   Endocrine: Negative for polyphagia.   Genitourinary:  Positive for frequency.   Musculoskeletal:  Positive for back pain, gait problem and joint swelling. Negative for arthralgias and neck pain.        Decreased ROM, joint and muscle pain   Skin:  Negative for wound.   Neurological:  Positive for dizziness, weakness and light-headedness. Negative for seizures.   Psychiatric/Behavioral:  Positive for dysphoric mood. Sleep disturbance: sleep apnea.The patient is nervous/anxious.            Past Medical History:   Diagnosis Date   • Anxiety    • Depression    • Hypertension    • Pneumonia    • Urinary tract infection        Past Surgical History:    Procedure Laterality Date   • HERNIA REPAIR         Family History   Problem Relation Age of Onset   • Diabetes Mother    • Cancer Father    • Cancer Brother    • Breast cancer Maternal Grandmother    • Cancer Maternal Grandmother        Social History     Occupational History   • Not on file   Tobacco Use   • Smoking status: Never   • Smokeless tobacco: Never   Vaping Use   • Vaping status: Never Used   Substance and Sexual Activity   • Alcohol use: Yes     Comment: 3-4x/week vodka   • Drug use: Not Currently     Types: Marijuana   • Sexual activity: Not on file         Current Outpatient Medications:   •  amitriptyline (ELAVIL) 75 mg tablet, Take 75 mg by mouth daily at bedtime, Disp: , Rfl:   •  Ascorbic Acid 500 MG/5ML LIQD, Take by mouth, Disp: , Rfl:   •  aspirin 81 mg chewable tablet, Chew 81 mg daily, Disp: , Rfl:   •  carvedilol (COREG) 25 mg tablet, Take 25 mg by mouth 2 (two) times a day with meals, Disp: , Rfl:   •  cholestyramine (QUESTRAN) 4 GM/DOSE powder, MIX 1 SCOOP IN 2 OUNCES OF WATER AND DRINK BY MOUTH EVERY DAY, Disp: , Rfl:   •  Cobalamin Combinations (Vitamin B12-Folic Acid) 500-400 MCG TABS, Take by mouth, Disp: , Rfl:   •  diazepam (VALIUM) 10 mg tablet, Take 10 mg by mouth every 6 (six) hours as needed for anxiety, Disp: , Rfl:   •  DULoxetine (CYMBALTA) 20 mg capsule, Take 1 capsule (20 mg total) by mouth daily, Disp: 30 capsule, Rfl: 0  •  primidone (MYSOLINE) 50 mg tablet, Take 50 mg by mouth 2 (two) times a day, Disp: , Rfl:   •  sacubitril-valsartan (Entresto) 49-51 MG TABS, , Disp: , Rfl:   •  Vitamin E 45 MG (100 UNIT) CAPS, Take 100 Units by mouth daily, Disp: , Rfl:   •  ascorbic acid (VITAMIN C) 500 MG TBCR, Take 1 tablet (500 mg total) by mouth 2 (two) times a day, Disp: 60 tablet, Rfl: 0  •  Cholecalciferol (VITAMIN D3) 1,000 units tablet, Take 1 tablet (1,000 Units total) by mouth daily, Disp: 30 tablet, Rfl: 0  •  clindamycin (CLEOCIN) 300 MG capsule, , Disp: , Rfl:   •   doxycycline hyclate (VIBRAMYCIN) 100 mg capsule, Take 1 capsule twice a day by oral route for 7 days., Disp: , Rfl:   •  ferrous sulfate 324 (65 Fe) mg, Take 1 tablet (324 mg total) by mouth daily before breakfast (Patient not taking: Reported on 1/16/2025), Disp: 30 tablet, Rfl: 0  •  folic acid (FOLVITE) 1 mg tablet, Take 1 tablet (1 mg total) by mouth daily, Disp: 30 tablet, Rfl: 0  •  losartan (COZAAR) 25 mg tablet, Take 25 mg by mouth 2 (two) times a day, Disp: , Rfl:   •  meloxicam (Mobic) 15 mg tablet, Take 1 tablet (15 mg total) by mouth daily, Disp: 30 tablet, Rfl: 0  •  mupirocin (BACTROBAN) 2 % ointment, Apply topically 2 (two) times a day Apply in each nares twice daily for five days before surgery. (Patient not taking: Reported on 2/10/2025), Disp: 22 g, Rfl: 0  •  primidone (MYSOLINE) 250 mg tablet, Take 500 mg by mouth 2 (two) times a day (Patient not taking: Reported on 5/17/2024), Disp: , Rfl:   •  Turmeric 500 MG CAPS, Take 500 mg by mouth daily (Patient not taking: Reported on 1/16/2025), Disp: , Rfl:   •  zinc sulfate (ZINCATE) 220 mg capsule, Take 1 capsule (220 mg total) by mouth daily, Disp: 30 capsule, Rfl: 0    Allergies   Allergen Reactions   • Amoxicillin Myalgia       Physical Exam:    There were no vitals taken for this visit.    Constitutional: normal, well developed, well nourished, alert, in no distress and non-toxic and no overt pain behavior.  Eyes: anicteric  HEENT: grossly intact  Neck: supple, symmetric, trachea midline and no masses   Pulmonary:even and unlabored  Cardiovascular:No edema or pitting edema present  Skin:Normal without rashes or lesions and well hydrated  Psychiatric:Mood and affect appropriate  Neurologic:Cranial Nerves II-XII grossly intact  Musculoskeletal:ambulates with cane    Imaging  XR KNEE 3 VW RIGHT NON INJURY     INDICATION: M17.0: Bilateral primary osteoarthritis of knee  M17.11: Unilateral primary osteoarthritis, right knee.     COMPARISON:  5/17/2024.     FINDINGS:     No acute fracture or dislocation.     Small joint effusion.     Medial compartment severe narrowing. Tricompartment osteophytes.     Left comparison images include.     No lytic or blastic osseous lesion.     Unremarkable soft tissues.     IMPRESSION:     Osteoarthritis.     No acute osseous abnormality.  No orders to display       No orders of the defined types were placed in this encounter.

## 2025-02-20 NOTE — TELEPHONE ENCOUNTER
Caller: Mejia     Doctor: Dr. Alba     Reason for call: Patient calling stating he is taking medication DULoxetine (CYMBALTA) 20 mg capsule and he feels tired and sleepy patient would like to know if this is a side effect of taking medication please advise        Call back#: [phone]

## 2025-02-20 NOTE — TELEPHONE ENCOUNTER
Yung...    S/w pt who advised he is having difficulty w/ Cymbalta causing incr drowsiness. Pt states taking in AM and unable to stay awake w/o nap mid day. RN advised pt to take med in evening or @ hs instead since drowsiness is a se of Cymbalta. RN advised pt this se my lessen as pt cont w/ Cymbalta on a daily consistent basis. Pt verbalized understanding. RN advised pt to contact office if drowsiness is not tolerable in AM and throughout the day even with taking it in the evening. Pt verbalized understanding and apprec of call.

## 2025-03-06 DIAGNOSIS — M25.561 CHRONIC PAIN OF RIGHT KNEE: ICD-10-CM

## 2025-03-06 DIAGNOSIS — G89.29 CHRONIC PAIN OF RIGHT KNEE: ICD-10-CM

## 2025-03-06 DIAGNOSIS — M17.11 PRIMARY OSTEOARTHRITIS OF RIGHT KNEE: ICD-10-CM

## 2025-03-07 RX ORDER — DULOXETIN HYDROCHLORIDE 20 MG/1
20 CAPSULE, DELAYED RELEASE ORAL DAILY
Qty: 30 CAPSULE | Refills: 0 | Status: SHIPPED | OUTPATIENT
Start: 2025-03-07 | End: 2025-04-06

## 2025-03-11 ENCOUNTER — OFFICE VISIT (OUTPATIENT)
Age: 75
End: 2025-03-11
Payer: COMMERCIAL

## 2025-03-11 DIAGNOSIS — G89.4 CHRONIC PAIN SYNDROME: ICD-10-CM

## 2025-03-11 DIAGNOSIS — Z79.891 LONG-TERM CURRENT USE OF OPIATE ANALGESIC: ICD-10-CM

## 2025-03-11 DIAGNOSIS — F11.20 UNCOMPLICATED OPIOID DEPENDENCE (HCC): ICD-10-CM

## 2025-03-11 DIAGNOSIS — M17.0 PRIMARY OSTEOARTHRITIS OF BOTH KNEES: Primary | ICD-10-CM

## 2025-03-11 PROCEDURE — G2211 COMPLEX E/M VISIT ADD ON: HCPCS | Performed by: STUDENT IN AN ORGANIZED HEALTH CARE EDUCATION/TRAINING PROGRAM

## 2025-03-11 PROCEDURE — 99214 OFFICE O/P EST MOD 30 MIN: CPT | Performed by: STUDENT IN AN ORGANIZED HEALTH CARE EDUCATION/TRAINING PROGRAM

## 2025-03-11 RX ORDER — CELECOXIB 200 MG/1
200 CAPSULE ORAL 2 TIMES DAILY PRN
Qty: 60 CAPSULE | Refills: 1 | Status: SHIPPED | OUTPATIENT
Start: 2025-03-11 | End: 2025-05-10

## 2025-03-11 NOTE — PROGRESS NOTES
Pain Medicine Follow-Up Note    Assessment:  1. Primary osteoarthritis of both knees    2. Long-term current use of opiate analgesic    3. Uncomplicated opioid dependence (HCC)    4. Chronic pain syndrome      Patient is a pleasant 74-year-old male who presents as a follow-up visit after last being seen on 2/10/2025 for bilateral knee pain.  Unfortunately patient is not a candidate for surgery due to multiple wounds and abrasions.  At last visit patient was started on duloxetine 20 mg daily but reported mental fog and clumsiness on this medication.  Patient symptoms are the same rated pain as 8 out of 10 on numeric rating scale.  The pain is intermittent and the quality pain is a sharp pain.  The pain does interfere with his daily activities and he has not been doing conservative treatment.    Extensive discussion regards to treatment options.  Patient states that after he transition to taking duloxetine at nighttime he has not noticed as many side effects from this medication is doing well with this.  Given this we will continue duloxetine at current dose and can consider increase to 30 mg daily in the future. There was some discussion about starting low-dose tramadol but discussed with patient that given he has on chronic benzodiazepine therapy I would be hesitant to start this medication.  Given this we will rotate from meloxicam to Celebrex 200 mg twice daily as needed.  Patient counseled to continue with use of Tylenol 1 g 3 times daily as needed as well and to continue a home exercise program.  Patient amenable to this plan will follow-up in 6 weeks.      Plan:  Start celebrex 200 mg BID prn   Continue apap 1g TID prn   Continue duloxetine 20 mg daily. Can consider increase to 30 mg daily when patient contacts office in two weeks   Continue HEP   Candidate for low dose opioid therapy with tramadol but given benzo use will hold off.   Orders Placed This Encounter   Procedures   • MM OF_Alprazolam Definitive   •  MM OF_Amphetamine Definitive Test   • MM OF_Buprenorphine Definitive Test   • MM OF_Carisoprodol Definitive Test   • MM OF_Clonazepam Definitive   • MM OF_Cocaine Definitive Test   • MM OF_Codeine Definitive   • MM OF_Dextromethorphan Definitive Test   • MM OF_Diazepam Definitive   • MM OF_Fentanyl Definitive Test   • MM OF_Gabapentin Definitive Test   • MM OF_Heroin Definitive Test   • MM OF_Hydrocodone Definitive   • MM OF_Hydromorphone Definitive   • MM OF_Levorphanol Definitive Test   • MM OF_Lorazepam Definitive   • MM OF_MDMA Definitive Test   • MM OF_Meperidine Definitive Test   • MM OF_Methadone Definitive Test   • MM OF_Methylphenidate Definitive Test   • MM OF_Morphine Definitive   • MM OF_Naltrexone Definitive Test   • MM OF_Oxazepam Definitive   • MM OF_Oxycodone Definitive Test - Oral Fluid   • MM OF_Oxymorphone Definitive Test   • MM OF_Phencyclidine Definitive Test   • MM OF_Pregabalin Definitive Test   • MM OF_Tapentadol Definitive Test   • MM OF_Temazepam Definitive   • MM OF_THC Definitive Test   • MM OF_Tramadol Definitive Test         New Medications Ordered This Visit   Medications   • celecoxib (CeleBREX) 200 mg capsule     Sig: Take 1 capsule (200 mg total) by mouth 2 (two) times a day as needed for mild pain or moderate pain     Dispense:  60 capsule     Refill:  1       My impressions and treatment recommendations were discussed in detail with the patient who verbalized understanding and had no further questions.          [place] Prescription Drug Monitoring Program report was reviewed and was appropriate  and [place] Prescription Drug Monitoring Program report was reviewed and was appropriate     A urine drug screen was collected at today's office visit as part of our medication management protocol. The point of care testing results were appropriate for what was being prescribed. The specimen will be sent for confirmatory testing. The drug screen is medically necessary because the  patient is either dependent on opioid medication or is being considered for opioid medication therapy and the results could impact ongoing or future treatment. The drug screen is to evaluate for the presences or absence of prescribed, non-prescribed, and/or illicit drugs/substances.    There are risks associated with opioid medications, including dependence, addiction and tolerance. The patient understands and agrees to use these medications only as prescribed. Potential side effects of the medications include, but are not limited to, constipation, drowsiness, addiction, impaired judgment and risk of fatal overdose if not taken as prescribed. The patient was warned against driving while taking sedation medications.  Sharing medications is a felony. At this point in time, the patient is showing no signs of addiction, abuse, diversion or suicidal ideation.      Follow-up is planned in six weeks time or sooner as warranted.  Discharge instructions were provided. I personally saw and examined the patient and I agree with the above discussed plan of care.    History of Present Illness:    Mejia Baez is a 74 y.o. male who presents to [facility] for interval re-evaluation of the above stated pain complaints. The patient has a past medical and chronic pain history as outlined in the assessment section. He was last seen on 2/10/2025.    Patient is a pleasant 74-year-old male who presents as a follow-up visit after last being seen on 2/10/2025 for bilateral knee pain.  Unfortunately patient is not a candidate for surgery due to multiple wounds and abrasions.  At last visit patient was started on duloxetine 20 mg daily but reported mental fog and clumsiness on this medication.  Patient symptoms are the same rated pain as 8 out of 10 on numeric rating scale.  The pain is intermittent and the quality pain is a sharp pain.  The pain does interfere with his daily activities and he has not been doing  conservative treatment.    Pain Contract Signed:  3/11/2025  Last Urine Drug Screen:  3/11/2025    Other than as stated above, the patient denies any interval changes in medications, medical condition, mental condition, symptoms, or allergies since the last office visit.         Review of Systems:    Review of Systems   Constitutional:  Negative for unexpected weight change.   HENT:  Negative for ear pain.    Eyes:  Negative for visual disturbance.   Respiratory:  Negative for shortness of breath and wheezing.    Gastrointestinal:  Negative for abdominal pain.   Genitourinary:  Positive for frequency.   Musculoskeletal:  Positive for gait problem. Negative for back pain.        B/L knee pain   Neurological:  Positive for weakness and light-headedness. Negative for numbness.   Psychiatric/Behavioral:  Positive for sleep disturbance. Negative for decreased concentration.          Past Medical History:   Diagnosis Date   • Anxiety    • Depression    • Hypertension    • Pneumonia    • Urinary tract infection        Past Surgical History:   Procedure Laterality Date   • HERNIA REPAIR         Family History   Problem Relation Age of Onset   • Diabetes Mother    • Cancer Father    • Cancer Brother    • Breast cancer Maternal Grandmother    • Cancer Maternal Grandmother        Social History     Occupational History   • Not on file   Tobacco Use   • Smoking status: Never   • Smokeless tobacco: Never   Vaping Use   • Vaping status: Never Used   Substance and Sexual Activity   • Alcohol use: Yes     Comment: 3-4x/week vodka   • Drug use: Not Currently     Types: Marijuana   • Sexual activity: Not on file         Current Outpatient Medications:   •  amitriptyline (ELAVIL) 75 mg tablet, Take 75 mg by mouth daily at bedtime, Disp: , Rfl:   •  Ascorbic Acid 500 MG/5ML LIQD, Take by mouth, Disp: , Rfl:   •  aspirin 81 mg chewable tablet, Chew 81 mg daily, Disp: , Rfl:   •  carvedilol (COREG) 25 mg tablet, Take 25 mg by mouth 2  (two) times a day with meals, Disp: , Rfl:   •  celecoxib (CeleBREX) 200 mg capsule, Take 1 capsule (200 mg total) by mouth 2 (two) times a day as needed for mild pain or moderate pain, Disp: 60 capsule, Rfl: 1  •  cholestyramine (QUESTRAN) 4 GM/DOSE powder, MIX 1 SCOOP IN 2 OUNCES OF WATER AND DRINK BY MOUTH EVERY DAY, Disp: , Rfl:   •  Cobalamin Combinations (Vitamin B12-Folic Acid) 500-400 MCG TABS, Take by mouth, Disp: , Rfl:   •  diazepam (VALIUM) 10 mg tablet, Take 10 mg by mouth every 6 (six) hours as needed for anxiety, Disp: , Rfl:   •  DULoxetine (CYMBALTA) 20 mg capsule, Take 1 capsule (20 mg total) by mouth daily, Disp: 30 capsule, Rfl: 0  •  primidone (MYSOLINE) 50 mg tablet, Take 50 mg by mouth 2 (two) times a day, Disp: , Rfl:   •  sacubitril-valsartan (Entresto) 49-51 MG TABS, , Disp: , Rfl:   •  Vitamin E 45 MG (100 UNIT) CAPS, Take 100 Units by mouth daily, Disp: , Rfl:   •  ascorbic acid (VITAMIN C) 500 MG TBCR, Take 1 tablet (500 mg total) by mouth 2 (two) times a day, Disp: 60 tablet, Rfl: 0  •  Cholecalciferol (VITAMIN D3) 1,000 units tablet, Take 1 tablet (1,000 Units total) by mouth daily, Disp: 30 tablet, Rfl: 0  •  clindamycin (CLEOCIN) 300 MG capsule, , Disp: , Rfl:   •  doxycycline hyclate (VIBRAMYCIN) 100 mg capsule, Take 1 capsule twice a day by oral route for 7 days., Disp: , Rfl:   •  ferrous sulfate 324 (65 Fe) mg, Take 1 tablet (324 mg total) by mouth daily before breakfast (Patient not taking: Reported on 1/16/2025), Disp: 30 tablet, Rfl: 0  •  folic acid (FOLVITE) 1 mg tablet, Take 1 tablet (1 mg total) by mouth daily, Disp: 30 tablet, Rfl: 0  •  losartan (COZAAR) 25 mg tablet, Take 25 mg by mouth 2 (two) times a day, Disp: , Rfl:   •  meloxicam (Mobic) 15 mg tablet, Take 1 tablet (15 mg total) by mouth daily, Disp: 30 tablet, Rfl: 0  •  mupirocin (BACTROBAN) 2 % ointment, Apply topically 2 (two) times a day Apply in each nares twice daily for five days before surgery. (Patient  not taking: Reported on 1/16/2025), Disp: 22 g, Rfl: 0  •  primidone (MYSOLINE) 250 mg tablet, Take 500 mg by mouth 2 (two) times a day (Patient not taking: Reported on 3/11/2025), Disp: , Rfl:   •  Turmeric 500 MG CAPS, Take 500 mg by mouth daily (Patient not taking: Reported on 3/11/2025), Disp: , Rfl:   •  zinc sulfate (ZINCATE) 220 mg capsule, Take 1 capsule (220 mg total) by mouth daily, Disp: 30 capsule, Rfl: 0    Allergies   Allergen Reactions   • Amoxicillin Myalgia       Physical Exam:    There were no vitals taken for this visit.    Constitutional:normal, well developed, well nourished, alert, in no distress and non-toxic and no overt pain behavior.  Eyes:anicteric  HEENT:grossly intact  Neck:supple, symmetric, trachea midline and no masses   Pulmonary:even and unlabored  Cardiovascular:No edema or pitting edema present  Skin:Normal without rashes or lesions and well hydrated  Psychiatric:Mood and affect appropriate  Neurologic:Cranial Nerves II-XII grossly intact  Musculoskeletal:normal gait.       Imaging  No orders to display         Orders Placed This Encounter   Procedures   • MM OF_Alprazolam Definitive   • MM OF_Amphetamine Definitive Test   • MM OF_Buprenorphine Definitive Test   • MM OF_Carisoprodol Definitive Test   • MM OF_Clonazepam Definitive   • MM OF_Cocaine Definitive Test   • MM OF_Codeine Definitive   • MM OF_Dextromethorphan Definitive Test   • MM OF_Diazepam Definitive   • MM OF_Fentanyl Definitive Test   • MM OF_Gabapentin Definitive Test   • MM OF_Heroin Definitive Test   • MM OF_Hydrocodone Definitive   • MM OF_Hydromorphone Definitive   • MM OF_Levorphanol Definitive Test   • MM OF_Lorazepam Definitive   • MM OF_MDMA Definitive Test   • MM OF_Meperidine Definitive Test   • MM OF_Methadone Definitive Test   • MM OF_Methylphenidate Definitive Test   • MM OF_Morphine Definitive   • MM OF_Naltrexone Definitive Test   • MM OF_Oxazepam Definitive   • MM OF_Oxycodone Definitive Test - Oral  Fluid   • MM OF_Oxymorphone Definitive Test   • MM OF_Phencyclidine Definitive Test   • MM OF_Pregabalin Definitive Test   • MM OF_Tapentadol Definitive Test   • MM OF_Temazepam Definitive   • MM OF_THC Definitive Test   • MM OF_Tramadol Definitive Test

## 2025-03-12 ENCOUNTER — TELEPHONE (OUTPATIENT)
Age: 75
End: 2025-03-12

## 2025-03-12 NOTE — TELEPHONE ENCOUNTER
Caller: pt    Doctor: Dr. vargas    Reason for call: pt has questions about the dosage of the cymbalta. Please advise.    Call back#: [phone]

## 2025-03-15 LAB
7AMINOCLONAZEPAM SAL QL CFM: NEGATIVE NG/ML
AMPHET SAL QL CFM: NEGATIVE NG/ML
BUPRENORPHINE SAL QL SCN: NEGATIVE NG/ML
CARBOXYTHC SAL QL CFM: NEGATIVE NG/ML
CCP IGG SERPL-ACNC: NEGATIVE NG/ML
COCAINE SAL QL CFM: NEGATIVE NG/ML
CODEINE SAL QL CFM: NEGATIVE NG/ML
D-METHORPHAN SAL QL CFM: NEGATIVE NG/ML
DXO+LEVORPHANOL SAL QL CFM: NEGATIVE NG/ML
DXO+LEVORPHANOL SAL QL CFM: NEGATIVE NG/ML
EDDP SAL QL CFM: NEGATIVE NG/ML
GABAPENTIN SAL QL CFM: NEGATIVE NG/ML
HYDROCODONE SAL QL CFM: NEGATIVE NG/ML
HYDROCODONE SAL QL CFM: NEGATIVE NG/ML
HYDROMORPHONE SAL QL CFM: NEGATIVE NG/ML
LEUKEMIA MARKERS BLD-IMP: NEGATIVE NG/ML
M PROTEIN 3 UR ELPH-MCNC: NEGATIVE NG/ML
M TB TUBERC IGNF/MITOGEN IGNF CONTROL: NEGATIVE NG/ML
METHADONE SAL QL CFM: NEGATIVE NG/ML
MORPHINE SAL QL CFM: NEGATIVE NG/ML
MORPHINE SAL QL CFM: NEGATIVE NG/ML
NALTREXOL SAL QL CFM: NEGATIVE NG/ML
NALTREXONE SAL QL CFM: NEGATIVE NG/ML
OXYMORPHONE SAL QL CFM: NEGATIVE NG/ML
OXYMORPHONE SAL QL CFM: NEGATIVE NG/ML
PCP SAL QL CFM: NEGATIVE NG/ML
PREGABALIN SAL QL CFM: NEGATIVE NG/ML
SL AMB 6-MAM (HEROIN METABOLITE) QUANTIFICATION: NEGATIVE NG/ML
SL AMB ALPRAZOLAM QUANTIFICATION: NEGATIVE NG/ML
SL AMB CARISOPRODOL QUANTIFICATION: NEGATIVE NG/ML
SL AMB CLONAZEPAM QUANTIFICATION: NEGATIVE NG/ML
SL AMB DIAZEPAM QUANTIFICATION: ABNORMAL NG/ML
SL AMB FENTANYL QUANTIFICATION: NEGATIVE NG/ML
SL AMB MDMA QUANTIFICATION: NEGATIVE NG/ML
SL AMB MEPROBAMATE QUANTIFICATION: NEGATIVE NG/ML
SL AMB N-DESMETHYL-TRAMADOL QUANTIFICATION SALIVA: NEGATIVE NG/ML
SL AMB NORBUPRENORPHINE QUANTIFICATION: NEGATIVE NG/ML
SL AMB NORDIAZEPAM QUANTIFICATION: ABNORMAL NG/ML
SL AMB NORFENTANYL QUANTIFICATION: NEGATIVE NG/ML
SL AMB NORHYDROCODONE QUANTIFICATION: NEGATIVE NG/ML
SL AMB NORHYDROCODONE QUANTIFICATION: NEGATIVE NG/ML
SL AMB NORMEPERIDINE QUANTIFICATION: NEGATIVE NG/ML
SL AMB NOROXYCODONE QUANTIFICATION: NEGATIVE NG/ML
SL AMB OXAZEPAM QUANTIFICATION: ABNORMAL NG/ML
SL AMB RITALINIC ACID QUANTIFICATION: NEGATIVE NG/ML
SL AMB TEMAZEPAM QUANTIFICATION: ABNORMAL NG/ML
SL AMB TEMAZEPAM QUANTIFICATION: ABNORMAL NG/ML
SL AMB TRAMADOL QUANTIFICATION: NEGATIVE NG/ML
SQUAMOUS #/AREA URNS HPF: NEGATIVE NG/ML
TAPENTADOL SAL QL CFM: NEGATIVE NG/ML

## 2025-03-31 ENCOUNTER — TELEPHONE (OUTPATIENT)
Age: 75
End: 2025-03-31

## 2025-03-31 NOTE — TELEPHONE ENCOUNTER
S/w pt who reports that he started the Celebrex 200 mg bid and has been taking Duloxetine 20 mg at hs. Pt has been taking Tylenol 1000 mg bid, will increase to tid.    Pt has occasionally added the amitriptyline at hs with the Duloxetine and wakes up very drowsy the next am.Pt does not take amitriptyline daily    Should pt stop the amitriptyline and increase Duloxetine?  Advised will d/w SJ upon return to office and cb

## 2025-03-31 NOTE — TELEPHONE ENCOUNTER
Caller: pt    Doctor: Dr. vargas    Reason for call: cymblata and the tylenol is not helping. He wants to know if the amitriptyline is ok to take from his other dr?    Call back#: [phone]

## 2025-04-01 DIAGNOSIS — M17.0 PRIMARY OSTEOARTHRITIS OF BOTH KNEES: Primary | ICD-10-CM

## 2025-04-01 RX ORDER — DULOXETIN HYDROCHLORIDE 30 MG/1
30 CAPSULE, DELAYED RELEASE ORAL DAILY
Qty: 30 CAPSULE | Refills: 0 | Status: SHIPPED | OUTPATIENT
Start: 2025-04-01 | End: 2025-05-01

## 2025-04-01 NOTE — TELEPHONE ENCOUNTER
S/w pt and advised of same. Pt will discuss tapering Amitriptyline with Dr Reyes. Pt verbalized understanding and appreciation.

## 2025-04-01 NOTE — TELEPHONE ENCOUNTER
Caller: pt    Doctor: Dr. vargas    Reason for call: pt doesn't remember talking to you about the amitriptyline he says he needs it for sleep, he also wants to know for how long can he take the tylenol?    Call back#: [phone]

## 2025-04-01 NOTE — TELEPHONE ENCOUNTER
AICHA    S/w pt, advised that the amitriptyline and duloxetine are a conflict. Advised pt to make Dr. Reyes aware that he has been prescribed duloxetine and will need to taper down and off of amitriptyline - how will this affect my sleep? Please cb with any questions or issues re: the plan once discussed with Dr. Reyes.     Advised pt, Tylenol is a prn medication. Ok to take up to 3,000 mg per day. Hopefully, the changes in your medication will provide enough relief that the pt will not feel that 3,000 mg of tylenol is needed each day. However, for the immediate future, ok to take tylenol 1000 mg tid. Pt verbalized understandign and appreciation.

## 2025-04-01 NOTE — TELEPHONE ENCOUNTER
Omkar Melchor MD  You6 minutes ago (9:35 AM)       Sorry amitriptyline.     Omkar Melchor MD  You7 minutes ago (9:35 AM)       But he should discuss tapering off nortriptyline with the prescribing physician.     Omkar Melchor MD  You8 minutes ago (9:34 AM)       Yes should discontinue nortriptyline and go up on duloxetine to 30 mg daily. I will send new script.

## 2025-04-04 DIAGNOSIS — M25.561 CHRONIC PAIN OF RIGHT KNEE: ICD-10-CM

## 2025-04-04 DIAGNOSIS — M17.11 PRIMARY OSTEOARTHRITIS OF RIGHT KNEE: ICD-10-CM

## 2025-04-04 DIAGNOSIS — G89.29 CHRONIC PAIN OF RIGHT KNEE: ICD-10-CM

## 2025-04-07 RX ORDER — DULOXETIN HYDROCHLORIDE 20 MG/1
20 CAPSULE, DELAYED RELEASE ORAL DAILY
Qty: 30 CAPSULE | Refills: 0 | Status: SHIPPED | OUTPATIENT
Start: 2025-04-07

## 2025-04-22 ENCOUNTER — OFFICE VISIT (OUTPATIENT)
Age: 75
End: 2025-04-22
Payer: COMMERCIAL

## 2025-04-22 DIAGNOSIS — M17.0 PRIMARY OSTEOARTHRITIS OF BOTH KNEES: Primary | ICD-10-CM

## 2025-04-22 DIAGNOSIS — B35.1 ONYCHOMYCOSIS: ICD-10-CM

## 2025-04-22 DIAGNOSIS — G89.4 CHRONIC PAIN SYNDROME: ICD-10-CM

## 2025-04-22 PROCEDURE — 99214 OFFICE O/P EST MOD 30 MIN: CPT | Performed by: STUDENT IN AN ORGANIZED HEALTH CARE EDUCATION/TRAINING PROGRAM

## 2025-04-22 PROCEDURE — G2211 COMPLEX E/M VISIT ADD ON: HCPCS | Performed by: STUDENT IN AN ORGANIZED HEALTH CARE EDUCATION/TRAINING PROGRAM

## 2025-04-22 RX ORDER — DICLOFENAC SODIUM 75 MG/1
75 TABLET, DELAYED RELEASE ORAL 2 TIMES DAILY
Qty: 60 TABLET | Refills: 0 | Status: SHIPPED | OUTPATIENT
Start: 2025-04-22 | End: 2025-05-22

## 2025-04-22 NOTE — PATIENT INSTRUCTIONS
-Stop celebrex  -Start diclofenac 75 mg twice a day  -Continue duloxetine 30 mg daily for another week. Call the office in a week and if no benefit we will double to 60 mg daily.

## 2025-04-22 NOTE — PROGRESS NOTES
Pain Medicine Follow-Up Note    Assessment:  1. Primary osteoarthritis of both knees    2. Chronic pain syndrome    3. Onychomycosis      Patient is a pleasant 74-year-old male who presents as a follow-up visit After last being seen on 3/11/2025 for OA of both knees and chronic pain syndrome.  At that time patient was started on Celebrex 200 mg twice daily as needed and counseled to continue with duloxetine 20 mg daily.  Patient symptoms are same rated pain a 9 out of 10 on numeric rating scale especially when sitting down.  The pain is constant and the quality pain is sharp pain.  The pain does interfere with his daily activities.    On further discussion with patient patient recently switched duloxetine to 30 mg dosing in the past couple weeks.  Given this discussed with patient that he should continue this for the next couple weeks and let the office know if this is providing benefit or not.  If no benefit we will double to 60 mg nightly.  In regards to the anti-inflammatory we will rotate from Celebrex to diclofenac 75 mg twice daily.  Patient is not a candidate for opiate therapy given his chronic benzodiazepine use.  In regards to surgery he states he is still not a candidate I will place a new referral for podiatry to see if they can help with his onychomycosis.  Plan:  Continue duloxetine 30 mg daily. If no benefit within two weeks, increase to 60 mg daily  Stop celebrex  Start diclofenac 75 mg BID   Continue HEP as tolerated  Follow up with podiatry   No orders of the defined types were placed in this encounter.      New Medications Ordered This Visit   Medications   • diclofenac (VOLTAREN) 75 mg EC tablet     Sig: Take 1 tablet (75 mg total) by mouth 2 (two) times a day     Dispense:  60 tablet     Refill:  0       My impressions and treatment recommendations were discussed in detail with the patient who verbalized understanding and had no further questions.      Follow-up is planned in three months time  or sooner as warranted.  Discharge instructions were provided. I personally saw and examined the patient and I agree with the above discussed plan of care.    History of Present Illness:    Mejia Baez is a 74 y.o. male who presents to [facility] for interval re-evaluation of the above stated pain complaints. The patient has a past medical and chronic pain history as outlined in the assessment section. He was last seen on 3/11/2025.    Patient is a pleasant 74-year-old male who presents as a follow-up visit After last being seen on 3/11/2025 for OA of both knees and chronic pain syndrome.  At that time patient was started on Celebrex 200 mg twice daily as needed and counseled to continue with duloxetine 20 mg daily.  Patient symptoms are same rated pain a 9 out of 10 on numeric rating scale especially when sitting down.  The pain is constant and the quality pain is sharp pain.  The pain does interfere with his daily activities.      Other than as stated above, the patient denies any interval changes in medications, medical condition, mental condition, symptoms, or allergies since the last office visit.         Review of Systems:    Review of Systems   Constitutional:  Negative for unexpected weight change.   HENT:  Negative for ear pain.    Eyes:  Negative for visual disturbance.   Respiratory:  Negative for shortness of breath and wheezing.    Gastrointestinal:  Negative for abdominal pain.   Musculoskeletal:  Positive for back pain, gait problem and joint swelling.   Neurological:  Positive for dizziness, weakness and numbness.   Psychiatric/Behavioral:  Positive for dysphoric mood and sleep disturbance. Negative for decreased concentration. The patient is nervous/anxious.          Past Medical History:   Diagnosis Date   • Anxiety    • Depression    • Hypertension    • Pneumonia    • Urinary tract infection        Past Surgical History:   Procedure Laterality Date   • HERNIA REPAIR          Family History   Problem Relation Age of Onset   • Diabetes Mother    • Cancer Father    • Cancer Brother    • Breast cancer Maternal Grandmother    • Cancer Maternal Grandmother        Social History     Occupational History   • Not on file   Tobacco Use   • Smoking status: Never   • Smokeless tobacco: Never   Vaping Use   • Vaping status: Never Used   Substance and Sexual Activity   • Alcohol use: Yes     Comment: 3-4x/week vodka   • Drug use: Not Currently     Types: Marijuana   • Sexual activity: Not on file         Current Outpatient Medications:   •  amitriptyline (ELAVIL) 75 mg tablet, Take 75 mg by mouth daily at bedtime, Disp: , Rfl:   •  Ascorbic Acid 500 MG/5ML LIQD, Take by mouth, Disp: , Rfl:   •  aspirin 81 mg chewable tablet, Chew 81 mg daily, Disp: , Rfl:   •  carvedilol (COREG) 25 mg tablet, Take 25 mg by mouth 2 (two) times a day with meals, Disp: , Rfl:   •  celecoxib (CeleBREX) 200 mg capsule, Take 1 capsule (200 mg total) by mouth 2 (two) times a day as needed for mild pain or moderate pain, Disp: 60 capsule, Rfl: 1  •  cholestyramine (QUESTRAN) 4 GM/DOSE powder, MIX 1 SCOOP IN 2 OUNCES OF WATER AND DRINK BY MOUTH EVERY DAY, Disp: , Rfl:   •  Cobalamin Combinations (Vitamin B12-Folic Acid) 500-400 MCG TABS, Take by mouth, Disp: , Rfl:   •  diazepam (VALIUM) 10 mg tablet, Take 10 mg by mouth every 6 (six) hours as needed for anxiety, Disp: , Rfl:   •  diclofenac (VOLTAREN) 75 mg EC tablet, Take 1 tablet (75 mg total) by mouth 2 (two) times a day, Disp: 60 tablet, Rfl: 0  •  DULoxetine (CYMBALTA) 30 mg delayed release capsule, Take 1 capsule (30 mg total) by mouth daily, Disp: 30 capsule, Rfl: 0  •  primidone (MYSOLINE) 50 mg tablet, Take 50 mg by mouth 2 (two) times a day, Disp: , Rfl:   •  sacubitril-valsartan (Entresto) 49-51 MG TABS, , Disp: , Rfl:   •  Vitamin E 45 MG (100 UNIT) CAPS, Take 100 Units by mouth daily, Disp: , Rfl:   •  ascorbic acid (VITAMIN C) 500 MG TBCR, Take 1 tablet  (500 mg total) by mouth 2 (two) times a day, Disp: 60 tablet, Rfl: 0  •  Cholecalciferol (VITAMIN D3) 1,000 units tablet, Take 1 tablet (1,000 Units total) by mouth daily, Disp: 30 tablet, Rfl: 0  •  clindamycin (CLEOCIN) 300 MG capsule, , Disp: , Rfl:   •  doxycycline hyclate (VIBRAMYCIN) 100 mg capsule, Take 1 capsule twice a day by oral route for 7 days., Disp: , Rfl:   •  DULoxetine (CYMBALTA) 20 mg capsule, Take 1 capsule (20 mg total) by mouth daily, Disp: 30 capsule, Rfl: 0  •  ferrous sulfate 324 (65 Fe) mg, Take 1 tablet (324 mg total) by mouth daily before breakfast (Patient not taking: Reported on 1/16/2025), Disp: 30 tablet, Rfl: 0  •  folic acid (FOLVITE) 1 mg tablet, Take 1 tablet (1 mg total) by mouth daily, Disp: 30 tablet, Rfl: 0  •  losartan (COZAAR) 25 mg tablet, Take 25 mg by mouth 2 (two) times a day, Disp: , Rfl:   •  mupirocin (BACTROBAN) 2 % ointment, Apply topically 2 (two) times a day Apply in each nares twice daily for five days before surgery. (Patient not taking: Reported on 1/16/2025), Disp: 22 g, Rfl: 0  •  primidone (MYSOLINE) 250 mg tablet, Take 500 mg by mouth 2 (two) times a day (Patient not taking: Reported on 5/17/2024), Disp: , Rfl:   •  Turmeric 500 MG CAPS, Take 500 mg by mouth daily (Patient not taking: Reported on 4/22/2025), Disp: , Rfl:   •  zinc sulfate (ZINCATE) 220 mg capsule, Take 1 capsule (220 mg total) by mouth daily, Disp: 30 capsule, Rfl: 0    Allergies   Allergen Reactions   • Amoxicillin Myalgia       Physical Exam:    There were no vitals taken for this visit.    Constitutional:normal, well developed, well nourished, alert, in no distress and non-toxic and no overt pain behavior.  Eyes:anicteric  HEENT:grossly intact  Neck:supple, symmetric, trachea midline and no masses   Pulmonary:even and unlabored  Cardiovascular:No edema or pitting edema present  Skin:Normal without rashes or lesions and well hydrated  Psychiatric:Mood and affect  appropriate  Neurologic:Cranial Nerves II-XII grossly intact  Musculoskeletal:normal gait. TTP in bilateral knees.       Imaging  No orders to display         No orders of the defined types were placed in this encounter.

## 2025-05-06 DIAGNOSIS — M17.0 PRIMARY OSTEOARTHRITIS OF BOTH KNEES: ICD-10-CM

## 2025-05-12 ENCOUNTER — TELEPHONE (OUTPATIENT)
Age: 75
End: 2025-05-12

## 2025-05-12 RX ORDER — DULOXETIN HYDROCHLORIDE 30 MG/1
30 CAPSULE, DELAYED RELEASE ORAL DAILY
Qty: 30 CAPSULE | Refills: 0 | Status: SHIPPED | OUTPATIENT
Start: 2025-05-12

## 2025-05-12 NOTE — TELEPHONE ENCOUNTER
Caller: Patient     Doctor: Dr. Melchor    Reason for call: Patient calling to update how he is doing with the new medication and increase of the other one: He states the new medication and increase has not helped much with his pain. Both knees are still hurting especially when he sits down.     Please advise     Call back#: [phone]

## 2025-05-13 NOTE — TELEPHONE ENCOUNTER
S/w pt and advised of the same, pt will call when refill is needed. Pt was appreciative of call.    Omkar Melchor MD  [facility] [id number] minutes ago (2:21 PM)       Would recommend he doubles duloxetine to 60 mg nightly. Once he is nearing running out, let me know and I can send in a new script.

## 2025-05-19 DIAGNOSIS — M17.0 PRIMARY OSTEOARTHRITIS OF BOTH KNEES: ICD-10-CM

## 2025-05-19 RX ORDER — DICLOFENAC SODIUM 75 MG/1
75 TABLET, DELAYED RELEASE ORAL 2 TIMES DAILY
Qty: 60 TABLET | Refills: 0 | Status: SHIPPED | OUTPATIENT
Start: 2025-05-19 | End: 2025-06-18

## 2025-07-11 DIAGNOSIS — M17.0 PRIMARY OSTEOARTHRITIS OF BOTH KNEES: ICD-10-CM

## 2025-07-11 NOTE — TELEPHONE ENCOUNTER
Reason for call:   [x] Refill   [] Prior Auth  [] Other:     Office:   [] PCP/Provider -   [x] Specialty/Provider - Omkar Melchor MD / [facility]     Medication: diclofenac (VOLTAREN) 75 mg EC tablet / Take 1 tablet (75 mg total) by mouth 2 (two) times a day    Pharmacy: [id number] [address]     Local Pharmacy   Does the patient have enough for 3 days?   [] Yes   [x] No - Send as HP to POD

## 2025-07-14 RX ORDER — DICLOFENAC SODIUM 75 MG/1
75 TABLET, DELAYED RELEASE ORAL 2 TIMES DAILY
Qty: 60 TABLET | Refills: 0 | Status: SHIPPED | OUTPATIENT
Start: 2025-07-14 | End: 2025-08-13

## 2025-07-14 NOTE — TELEPHONE ENCOUNTER
Patient called the rx line. He said someone called him but did not leave a message. I see a message from Angeline. It's not clear what I was suppose to relay to the patient. Please call the patient back.

## 2025-07-14 NOTE — TELEPHONE ENCOUNTER
Attempted to contact pt. RADHA. Provided with cb# and OH    LP 5/19/25 #60 no refills.  Last OVS 4/22/25

## 2025-07-14 NOTE — TELEPHONE ENCOUNTER
S/w pt who advised that the Diclofenac really does not help his pain. Pt has OVS 7/22/25 with [facility] and does not want to pay for a medication that does not help him. Pt would like to discuss options at that time.       Per pt, he does not want to have messages left on his phone as he is unable to retrieve them. Pt advised to update his medical communication form when he come in for his office visit and to sign up for MY chart to be able to communicate with doctor offices. Pt appreciative of info.    [facility]- Please refuse refill. Thank you

## 2025-07-22 ENCOUNTER — APPOINTMENT (OUTPATIENT)
Dept: RADIOLOGY | Facility: CLINIC | Age: 75
End: 2025-07-22
Attending: STUDENT IN AN ORGANIZED HEALTH CARE EDUCATION/TRAINING PROGRAM
Payer: COMMERCIAL

## 2025-07-22 ENCOUNTER — OFFICE VISIT (OUTPATIENT)
Age: 75
End: 2025-07-22
Payer: COMMERCIAL

## 2025-07-22 DIAGNOSIS — M25.552 LEFT HIP PAIN: ICD-10-CM

## 2025-07-22 DIAGNOSIS — M70.62 GREATER TROCHANTERIC BURSITIS OF LEFT HIP: ICD-10-CM

## 2025-07-22 DIAGNOSIS — G89.29 CHRONIC RIGHT SHOULDER PAIN: ICD-10-CM

## 2025-07-22 DIAGNOSIS — M25.511 CHRONIC RIGHT SHOULDER PAIN: ICD-10-CM

## 2025-07-22 DIAGNOSIS — B35.1 ONYCHOMYCOSIS: ICD-10-CM

## 2025-07-22 DIAGNOSIS — M17.0 PRIMARY OSTEOARTHRITIS OF BOTH KNEES: Primary | ICD-10-CM

## 2025-07-22 PROCEDURE — 73502 X-RAY EXAM HIP UNI 2-3 VIEWS: CPT

## 2025-07-22 PROCEDURE — 73030 X-RAY EXAM OF SHOULDER: CPT

## 2025-07-22 PROCEDURE — G2211 COMPLEX E/M VISIT ADD ON: HCPCS | Performed by: STUDENT IN AN ORGANIZED HEALTH CARE EDUCATION/TRAINING PROGRAM

## 2025-07-22 PROCEDURE — 99214 OFFICE O/P EST MOD 30 MIN: CPT | Performed by: STUDENT IN AN ORGANIZED HEALTH CARE EDUCATION/TRAINING PROGRAM

## 2025-07-22 RX ORDER — TIZANIDINE 2 MG/1
2 TABLET ORAL 2 TIMES DAILY PRN
Qty: 60 TABLET | Refills: 0 | Status: SHIPPED | OUTPATIENT
Start: 2025-07-22 | End: 2025-08-21

## 2025-07-22 RX ORDER — NABUMETONE 500 MG/1
500 TABLET, FILM COATED ORAL 2 TIMES DAILY PRN
Qty: 60 TABLET | Refills: 0 | Status: SHIPPED | OUTPATIENT
Start: 2025-07-22 | End: 2025-08-21

## 2025-07-22 NOTE — PROGRESS NOTES
Name: Mejia Baez      : 1950      MRN: [id number]  Encounter Provider: Omkar Melchor MD  Encounter Date: 2025   [facility]  :  Assessment & Plan  Primary osteoarthritis of both knees         Chronic right shoulder pain    Orders:  •  XR shoulder 2+ vw right; Future  •  XR hip/pelv 2-3 vws left if performed; Future  •  Ambulatory Referral to Physical Therapy; Future  •  nabumetone (RELAFEN) 500 mg tablet; Take 1 tablet (500 mg total) by mouth 2 (two) times a day as needed for mild pain  •  tiZANidine (ZANAFLEX) 2 mg tablet; Take 1 tablet (2 mg total) by mouth 2 (two) times a day as needed for muscle spasms    Left hip pain    Orders:  •  XR hip/pelv 2-3 vws left if performed; Future  •  Ambulatory Referral to Physical Therapy; Future    Onychomycosis    Orders:  •  Ambulatory Referral to Podiatry; Future    Greater trochanteric bursitis of left hip           Patient is a pleasant 74-year-old male who presents as a follow-up visit after last being seen on 2025 for symptoms of OA of bilateral knees.  Patient presenting today with new right shoulder pain along with left hip pain.  He states pain is a 8 to a 10 out of 10 when walking.  The pain is constant and the quality pain is a burning, sharp, throbbing sensation.  The pain does interfere with his daily activities.  At last visit patient was continued on duloxetine 30 mg daily and counseled that if he had no benefit in 2 weeks to contact the office for an increase.  He was also started on diclofenac 75 mg twice daily. Patient reports diclofenac was not helpful so he discontinued.     Extensive discussion with patient regards to treatment options.  Will rotate from diclofenac to nabumetone 500 mg twice daily as needed.  Will also start tizanidine 2 mg nightly as needed.  Did also discuss [employer] PNS therapy with the patient and he will let the office know if he wants to proceed with this.   Patient also scheduled to follow-up with podiatry in regards to his right foot neuropathy and onychomycosis. Patient with symptoms of greater trochanter bursitis of the left hip, will place a referral to PT.   Complete risks and benefits including bleeding, infection, tissue reaction, nerve injury and allergic reaction were discussed. The approach was demonstrated using models and literature was provided. Verbal and written consent was obtained.    My impressions and treatment recommendations were discussed in detail with the patient who verbalized understanding and had no further questions.  Discharge instructions were provided. I personally saw and examined the patient and I agree with the above discussed plan of care.    History of Present Illness     Mejia Baez is a 74 y.o. male who presents for a follow up office visit in regards to No chief complaint on file.  Patient is a pleasant 74-year-old male who presents as a follow-up visit after last being seen on 4/22/2025 for symptoms of OA of bilateral knees.  Patient presenting today with new right shoulder pain along with left hip pain.  He states pain is a 8 to a 10 out of 10 when walking.  The pain is constant and the quality pain is a burning, sharp, throbbing sensation.  The pain does interfere with his daily activities.  At last visit patient was continued on duloxetine 30 mg daily and counseled that if he had no benefit in 2 weeks to contact the office for an increase.  He was also started on diclofenac 75 mg twice daily. Patient reports diclofenac was not helpful so he discontinued.         Opioid contract date    Last UDS Date: No results in last 5 years                    last taken on    Review of Systems   Constitutional:  Negative for unexpected weight change.   HENT:  Negative for ear pain.    Eyes:  Negative for visual disturbance.   Respiratory:  Negative for shortness of breath and wheezing.    Gastrointestinal:  Negative for abdominal pain.    Musculoskeletal:  Positive for gait problem and joint swelling. Negative for back pain.        Decreased ROM, joint and muscle pain    Neurological:  Positive for dizziness, weakness and light-headedness. Negative for numbness.   Psychiatric/Behavioral:  Positive for sleep disturbance. Negative for decreased concentration.        Medical History Reviewed by provider this encounter:  Tobacco  Allergies  Meds  Problems  Med Hx  Surg Hx  Fam Hx     .       Objective   There were no vitals taken for this visit.     Pain Score: 10-Worst pain ever  Physical Exam  Constitutional: normal, well developed, well nourished, alert, in no distress and non-toxic and no overt pain behavior.  Eyes: anicteric  HEENT: grossly intact  Neck: supple, symmetric, trachea midline and no masses   Pulmonary: even and unlabored  Cardiovascular: No edema or pitting edema present  Skin: Normal without rashes or lesions and well hydrated  Psychiatric: Mood and affect appropriate  Neurologic: Cranial Nerves II-XII grossly intact  Musculoskeletal: ambulates with cane. TTP in bilateral knees, right shoulder and left GTB.

## 2025-07-25 ENCOUNTER — TELEPHONE (OUTPATIENT)
Age: 75
End: 2025-07-25

## 2025-07-25 NOTE — TELEPHONE ENCOUNTER
Caller: Patient     Doctor: Dr. Melchor     Reason for call: Patient returning call to discuss medication with nurse.     Please assist     Call back#: [phone]

## 2025-07-25 NOTE — TELEPHONE ENCOUNTER
S/w the patient and reviewed the previous. He stated he will try cutting it in half to see if that helps.

## 2025-07-25 NOTE — TELEPHONE ENCOUNTER
Caller: Patient    Doctor: Dr. FRANCIS    Reason for call: Patient would like to switch his script or lower his dosage for   tiZANidine (ZANAFLEX) 2 mg tablet     Stated med is to strong and makes him very sleepy through out the day    Please advise  Would like a call back once XR results are in as well    Call back#:

## 2025-07-28 ENCOUNTER — RESULTS FOLLOW-UP (OUTPATIENT)
Dept: PAIN MEDICINE | Facility: CLINIC | Age: 75
End: 2025-07-28

## 2025-08-22 DIAGNOSIS — G89.29 CHRONIC RIGHT SHOULDER PAIN: ICD-10-CM

## 2025-08-22 DIAGNOSIS — M25.511 CHRONIC RIGHT SHOULDER PAIN: ICD-10-CM

## 2025-08-22 RX ORDER — NABUMETONE 500 MG/1
500 TABLET, FILM COATED ORAL 2 TIMES DAILY PRN
Qty: 60 TABLET | Refills: 0 | Status: SHIPPED | OUTPATIENT
Start: 2025-08-22 | End: 2025-09-21